# Patient Record
Sex: FEMALE | Race: WHITE | NOT HISPANIC OR LATINO | Employment: OTHER | URBAN - METROPOLITAN AREA
[De-identification: names, ages, dates, MRNs, and addresses within clinical notes are randomized per-mention and may not be internally consistent; named-entity substitution may affect disease eponyms.]

---

## 2017-01-11 ENCOUNTER — TRANSCRIBE ORDERS (OUTPATIENT)
Dept: ADMINISTRATIVE | Facility: HOSPITAL | Age: 72
End: 2017-01-11

## 2017-01-11 DIAGNOSIS — Z12.31 VISIT FOR SCREENING MAMMOGRAM: Primary | ICD-10-CM

## 2017-01-13 ENCOUNTER — HOSPITAL ENCOUNTER (OUTPATIENT)
Dept: MAMMOGRAPHY | Facility: HOSPITAL | Age: 72
Discharge: HOME | End: 2017-01-13
Admitting: FAMILY MEDICINE

## 2017-01-13 DIAGNOSIS — Z12.31 VISIT FOR SCREENING MAMMOGRAM: ICD-10-CM

## 2017-01-13 PROCEDURE — 77063 BREAST TOMOSYNTHESIS BI: CPT | Performed by: RADIOLOGY

## 2017-01-13 PROCEDURE — G0202 SCR MAMMO BI INCL CAD: HCPCS | Performed by: RADIOLOGY

## 2017-01-13 PROCEDURE — 77063 BREAST TOMOSYNTHESIS BI: CPT

## 2017-01-13 PROCEDURE — G0202 SCR MAMMO BI INCL CAD: HCPCS

## 2017-12-04 ENCOUNTER — TRANSCRIBE ORDERS (OUTPATIENT)
Dept: ADMINISTRATIVE | Facility: HOSPITAL | Age: 72
End: 2017-12-04

## 2017-12-04 DIAGNOSIS — Z12.31 VISIT FOR SCREENING MAMMOGRAM: Primary | ICD-10-CM

## 2018-02-05 ENCOUNTER — APPOINTMENT (OUTPATIENT)
Dept: MAMMOGRAPHY | Facility: HOSPITAL | Age: 73
End: 2018-02-05

## 2018-02-16 ENCOUNTER — HOSPITAL ENCOUNTER (OUTPATIENT)
Dept: MAMMOGRAPHY | Facility: HOSPITAL | Age: 73
Discharge: HOME | End: 2018-02-16
Admitting: FAMILY MEDICINE

## 2018-02-16 DIAGNOSIS — Z12.31 VISIT FOR SCREENING MAMMOGRAM: ICD-10-CM

## 2018-02-16 PROCEDURE — 77067 SCR MAMMO BI INCL CAD: CPT | Performed by: RADIOLOGY

## 2018-02-16 PROCEDURE — 77063 BREAST TOMOSYNTHESIS BI: CPT

## 2018-02-16 PROCEDURE — 77063 BREAST TOMOSYNTHESIS BI: CPT | Performed by: RADIOLOGY

## 2018-02-16 PROCEDURE — 77067 SCR MAMMO BI INCL CAD: CPT

## 2019-01-09 ENCOUNTER — TRANSCRIBE ORDERS (OUTPATIENT)
Dept: ADMINISTRATIVE | Facility: HOSPITAL | Age: 74
End: 2019-01-09

## 2019-01-09 DIAGNOSIS — Z12.31 VISIT FOR SCREENING MAMMOGRAM: Primary | ICD-10-CM

## 2019-02-22 ENCOUNTER — HOSPITAL ENCOUNTER (OUTPATIENT)
Dept: MAMMOGRAPHY | Facility: HOSPITAL | Age: 74
Discharge: HOME | End: 2019-02-22
Admitting: INTERNAL MEDICINE

## 2019-02-22 DIAGNOSIS — Z12.31 VISIT FOR SCREENING MAMMOGRAM: ICD-10-CM

## 2019-02-22 PROCEDURE — 77067 SCR MAMMO BI INCL CAD: CPT

## 2019-02-22 PROCEDURE — 77063 BREAST TOMOSYNTHESIS BI: CPT | Performed by: RADIOLOGY

## 2019-02-22 PROCEDURE — 77067 SCR MAMMO BI INCL CAD: CPT | Performed by: RADIOLOGY

## 2019-02-22 PROCEDURE — 77063 BREAST TOMOSYNTHESIS BI: CPT

## 2019-11-06 ENCOUNTER — TRANSCRIBE ORDERS (OUTPATIENT)
Dept: ADMINISTRATIVE | Facility: HOSPITAL | Age: 74
End: 2019-11-06

## 2019-11-06 DIAGNOSIS — K59.09 OTHER CONSTIPATION: Primary | ICD-10-CM

## 2019-11-07 ENCOUNTER — HOSPITAL ENCOUNTER (OUTPATIENT)
Dept: GENERAL RADIOLOGY | Facility: HOSPITAL | Age: 74
Discharge: HOME | End: 2019-11-07
Admitting: COLON & RECTAL SURGERY

## 2019-11-07 DIAGNOSIS — K59.09 OTHER CONSTIPATION: ICD-10-CM

## 2019-11-07 PROCEDURE — 74270 X-RAY XM COLON 1CNTRST STD: CPT

## 2019-11-07 PROCEDURE — 0 DIATRIZOATE MEGLUMINE & SODIUM PER 1 ML: Performed by: COLON & RECTAL SURGERY

## 2019-11-07 RX ADMIN — DIATRIZOATE MEGLUMINE AND DIATRIZOATE SODIUM 480 ML: 660; 100 LIQUID ORAL; RECTAL at 09:30

## 2020-01-13 ENCOUNTER — TRANSCRIBE ORDERS (OUTPATIENT)
Dept: ADMINISTRATIVE | Facility: HOSPITAL | Age: 75
End: 2020-01-13

## 2020-01-13 DIAGNOSIS — Z12.31 VISIT FOR SCREENING MAMMOGRAM: Primary | ICD-10-CM

## 2020-01-15 ENCOUNTER — HOSPITAL ENCOUNTER (OUTPATIENT)
Dept: GENERAL RADIOLOGY | Facility: HOSPITAL | Age: 75
Discharge: HOME | End: 2020-01-15
Admitting: COLON & RECTAL SURGERY

## 2020-01-15 ENCOUNTER — APPOINTMENT (OUTPATIENT)
Dept: PREADMISSION TESTING | Facility: HOSPITAL | Age: 75
End: 2020-01-15

## 2020-01-15 VITALS — HEIGHT: 69 IN | BODY MASS INDEX: 26.36 KG/M2 | WEIGHT: 178 LBS

## 2020-01-15 LAB
ANION GAP SERPL CALCULATED.3IONS-SCNC: 12 MMOL/L (ref 5–15)
BUN BLD-MCNC: 18 MG/DL (ref 8–23)
BUN/CREAT SERPL: 22 (ref 7–25)
CALCIUM SPEC-SCNC: 10.1 MG/DL (ref 8.6–10.5)
CHLORIDE SERPL-SCNC: 102 MMOL/L (ref 98–107)
CO2 SERPL-SCNC: 27 MMOL/L (ref 22–29)
CREAT BLD-MCNC: 0.82 MG/DL (ref 0.57–1)
DEPRECATED RDW RBC AUTO: 44.1 FL (ref 37–54)
ERYTHROCYTE [DISTWIDTH] IN BLOOD BY AUTOMATED COUNT: 12.7 % (ref 12.3–15.4)
GFR SERPL CREATININE-BSD FRML MDRD: 68 ML/MIN/1.73
GLUCOSE BLD-MCNC: 108 MG/DL (ref 65–99)
HBA1C MFR BLD: 5.5 % (ref 4.8–5.6)
HCT VFR BLD AUTO: 40.7 % (ref 34–46.6)
HGB BLD-MCNC: 13.2 G/DL (ref 12–15.9)
MCH RBC QN AUTO: 30.6 PG (ref 26.6–33)
MCHC RBC AUTO-ENTMCNC: 32.4 G/DL (ref 31.5–35.7)
MCV RBC AUTO: 94.4 FL (ref 79–97)
PLATELET # BLD AUTO: 278 10*3/MM3 (ref 140–450)
PMV BLD AUTO: 9.7 FL (ref 6–12)
POTASSIUM BLD-SCNC: 4.4 MMOL/L (ref 3.5–5.2)
RBC # BLD AUTO: 4.31 10*6/MM3 (ref 3.77–5.28)
SODIUM BLD-SCNC: 141 MMOL/L (ref 136–145)
WBC NRBC COR # BLD: 8.66 10*3/MM3 (ref 3.4–10.8)

## 2020-01-15 PROCEDURE — 36415 COLL VENOUS BLD VENIPUNCTURE: CPT

## 2020-01-15 PROCEDURE — 80048 BASIC METABOLIC PNL TOTAL CA: CPT | Performed by: COLON & RECTAL SURGERY

## 2020-01-15 PROCEDURE — 71046 X-RAY EXAM CHEST 2 VIEWS: CPT

## 2020-01-15 PROCEDURE — 85027 COMPLETE CBC AUTOMATED: CPT | Performed by: COLON & RECTAL SURGERY

## 2020-01-15 PROCEDURE — 83036 HEMOGLOBIN GLYCOSYLATED A1C: CPT | Performed by: COLON & RECTAL SURGERY

## 2020-01-15 PROCEDURE — 93005 ELECTROCARDIOGRAM TRACING: CPT

## 2020-01-15 RX ORDER — MELATONIN
1000 DAILY
COMMUNITY

## 2020-01-15 RX ORDER — OMEPRAZOLE 40 MG/1
40 CAPSULE, DELAYED RELEASE ORAL DAILY
COMMUNITY

## 2020-01-15 RX ORDER — DICLOFENAC SODIUM 75 MG/1
75 TABLET, DELAYED RELEASE ORAL DAILY
COMMUNITY
End: 2020-01-22 | Stop reason: HOSPADM

## 2020-01-15 RX ORDER — VITAMIN B COMPLEX
1 CAPSULE ORAL DAILY
COMMUNITY

## 2020-01-15 RX ORDER — MONTELUKAST SODIUM 10 MG/1
10 TABLET ORAL NIGHTLY
COMMUNITY

## 2020-01-15 RX ORDER — DIPHENHYDRAMINE HCL 25 MG
25 CAPSULE ORAL NIGHTLY PRN
COMMUNITY

## 2020-01-15 RX ORDER — SCOLOPAMINE TRANSDERMAL SYSTEM 1 MG/1
1 PATCH, EXTENDED RELEASE TRANSDERMAL CONTINUOUS
Status: DISCONTINUED | OUTPATIENT
Start: 2020-01-15 | End: 2020-01-15

## 2020-01-15 RX ORDER — ATORVASTATIN CALCIUM 40 MG/1
40 TABLET, FILM COATED ORAL NIGHTLY
COMMUNITY

## 2020-01-15 RX ORDER — ESTRADIOL 0.07 MG/D
1 FILM, EXTENDED RELEASE TRANSDERMAL 2 TIMES WEEKLY
COMMUNITY

## 2020-01-15 RX ORDER — AZELASTINE 1 MG/ML
1 SPRAY, METERED NASAL 2 TIMES DAILY
COMMUNITY

## 2020-01-15 NOTE — PAT
Patient to apply Chlorhexadine wipes  to surgical area (as instructed) the night before procedure and the AM of procedure. Wipes provided.    Patient instructed to drink 20 ounces (or until full) of Gatorade and it needs to be completed 1 hour before given arrival time for procedure (NO RED Gatorade)    Patient verbalized understanding.    Ally Webb notified of MRSA history.      Misa Ramirez GI Navigator, notified of pt surgery.

## 2020-01-19 ENCOUNTER — ANESTHESIA EVENT (OUTPATIENT)
Dept: PERIOP | Facility: HOSPITAL | Age: 75
End: 2020-01-19

## 2020-01-19 RX ORDER — FAMOTIDINE 20 MG/1
20 TABLET, FILM COATED ORAL ONCE
Status: CANCELLED | OUTPATIENT
Start: 2020-01-19 | End: 2020-01-19

## 2020-01-20 ENCOUNTER — ANESTHESIA (OUTPATIENT)
Dept: PERIOP | Facility: HOSPITAL | Age: 75
End: 2020-01-20

## 2020-01-20 ENCOUNTER — HOSPITAL ENCOUNTER (INPATIENT)
Facility: HOSPITAL | Age: 75
LOS: 2 days | Discharge: HOME | End: 2020-01-22
Attending: COLON & RECTAL SURGERY | Admitting: COLON & RECTAL SURGERY

## 2020-01-20 DIAGNOSIS — K62.3 RECTAL MUCOSA PROLAPSE: ICD-10-CM

## 2020-01-20 DIAGNOSIS — K59.09 CHRONIC CONSTIPATION: Primary | ICD-10-CM

## 2020-01-20 PROBLEM — Z86.14 HISTORY OF MRSA INFECTION: Status: ACTIVE | Noted: 2020-01-20

## 2020-01-20 PROBLEM — K21.9 GERD (GASTROESOPHAGEAL REFLUX DISEASE): Status: ACTIVE | Noted: 2020-01-20

## 2020-01-20 PROBLEM — J47.9 BRONCHIECTASIS WITHOUT COMPLICATION (HCC): Status: ACTIVE | Noted: 2020-01-20

## 2020-01-20 PROBLEM — D83.9 CVID (COMMON VARIABLE IMMUNODEFICIENCY): Status: ACTIVE | Noted: 2020-01-20

## 2020-01-20 PROBLEM — E78.2 MIXED HYPERLIPIDEMIA: Status: ACTIVE | Noted: 2020-01-20

## 2020-01-20 PROCEDURE — 25010000002 HEPARIN (PORCINE) PER 1000 UNITS: Performed by: COLON & RECTAL SURGERY

## 2020-01-20 PROCEDURE — 99231 SBSQ HOSP IP/OBS SF/LOW 25: CPT | Performed by: PHYSICIAN ASSISTANT

## 2020-01-20 PROCEDURE — 0DQP0ZZ REPAIR RECTUM, OPEN APPROACH: ICD-10-PCS | Performed by: COLON & RECTAL SURGERY

## 2020-01-20 PROCEDURE — 25010000002 DEXAMETHASONE PER 1 MG: Performed by: NURSE ANESTHETIST, CERTIFIED REGISTERED

## 2020-01-20 PROCEDURE — 0DNW0ZZ RELEASE PERITONEUM, OPEN APPROACH: ICD-10-PCS | Performed by: COLON & RECTAL SURGERY

## 2020-01-20 PROCEDURE — 88307 TISSUE EXAM BY PATHOLOGIST: CPT | Performed by: COLON & RECTAL SURGERY

## 2020-01-20 PROCEDURE — 25010000002 ONDANSETRON PER 1 MG: Performed by: COLON & RECTAL SURGERY

## 2020-01-20 PROCEDURE — 25010000002 HYDROMORPHONE PER 4 MG: Performed by: ANESTHESIOLOGY

## 2020-01-20 PROCEDURE — 25010000002 FENTANYL CITRATE (PF) 100 MCG/2ML SOLUTION: Performed by: NURSE ANESTHETIST, CERTIFIED REGISTERED

## 2020-01-20 PROCEDURE — 25010000003 LIDOCAINE 1 % SOLUTION: Performed by: NURSE ANESTHETIST, CERTIFIED REGISTERED

## 2020-01-20 PROCEDURE — 25010000002 ONDANSETRON PER 1 MG: Performed by: NURSE ANESTHETIST, CERTIFIED REGISTERED

## 2020-01-20 PROCEDURE — 25010000002 DEXAMETHASONE SODIUM PHOSPHATE 10 MG/ML SOLUTION: Performed by: ANESTHESIOLOGY

## 2020-01-20 PROCEDURE — 25010000002 VANCOMYCIN 10 G RECONSTITUTED SOLUTION: Performed by: COLON & RECTAL SURGERY

## 2020-01-20 PROCEDURE — 0DTN0ZZ RESECTION OF SIGMOID COLON, OPEN APPROACH: ICD-10-PCS | Performed by: COLON & RECTAL SURGERY

## 2020-01-20 PROCEDURE — 25010000002 NEOSTIGMINE 10 MG/10ML SOLUTION: Performed by: NURSE ANESTHETIST, CERTIFIED REGISTERED

## 2020-01-20 PROCEDURE — 25010000002 PROPOFOL 10 MG/ML EMULSION: Performed by: NURSE ANESTHETIST, CERTIFIED REGISTERED

## 2020-01-20 PROCEDURE — 25010000002 PHENYLEPHRINE PER 1 ML: Performed by: NURSE ANESTHETIST, CERTIFIED REGISTERED

## 2020-01-20 PROCEDURE — 0WQF0ZZ REPAIR ABDOMINAL WALL, OPEN APPROACH: ICD-10-PCS | Performed by: COLON & RECTAL SURGERY

## 2020-01-20 DEVICE — ENDOSCOPIC CURVED INTRALUMINAL STAPLER (ILS) 24 TITANIUM ADJUSTABLE HEIGHT STAPLES: Type: IMPLANTABLE DEVICE | Site: ABDOMEN | Status: FUNCTIONAL

## 2020-01-20 DEVICE — PROXIMATE RELOADABLE LINEAR CUTTER WITH SAFETY LOCK-OUT.  55MM LINEAR CUTTER.
Type: IMPLANTABLE DEVICE | Site: ABDOMEN | Status: FUNCTIONAL
Brand: PROXIMATE

## 2020-01-20 RX ORDER — PROPOFOL 10 MG/ML
VIAL (ML) INTRAVENOUS AS NEEDED
Status: DISCONTINUED | OUTPATIENT
Start: 2020-01-20 | End: 2020-01-20 | Stop reason: SURG

## 2020-01-20 RX ORDER — ALVIMOPAN 12 MG/1
12 CAPSULE ORAL 2 TIMES DAILY
Status: DISCONTINUED | OUTPATIENT
Start: 2020-01-21 | End: 2020-01-22

## 2020-01-20 RX ORDER — ATORVASTATIN CALCIUM 40 MG/1
40 TABLET, FILM COATED ORAL NIGHTLY
Status: DISCONTINUED | OUTPATIENT
Start: 2020-01-20 | End: 2020-01-22 | Stop reason: HOSPADM

## 2020-01-20 RX ORDER — ALVIMOPAN 12 MG/1
12 CAPSULE ORAL ONCE
Status: DISCONTINUED | OUTPATIENT
Start: 2020-01-20 | End: 2020-01-20

## 2020-01-20 RX ORDER — NEOSTIGMINE METHYLSULFATE 1 MG/ML
INJECTION, SOLUTION INTRAVENOUS AS NEEDED
Status: DISCONTINUED | OUTPATIENT
Start: 2020-01-20 | End: 2020-01-20 | Stop reason: SURG

## 2020-01-20 RX ORDER — ONDANSETRON 2 MG/ML
4 INJECTION INTRAMUSCULAR; INTRAVENOUS EVERY 6 HOURS PRN
Status: DISCONTINUED | OUTPATIENT
Start: 2020-01-20 | End: 2020-01-22 | Stop reason: HOSPADM

## 2020-01-20 RX ORDER — LIDOCAINE HYDROCHLORIDE 10 MG/ML
0.5 INJECTION, SOLUTION EPIDURAL; INFILTRATION; INTRACAUDAL; PERINEURAL ONCE AS NEEDED
Status: COMPLETED | OUTPATIENT
Start: 2020-01-20 | End: 2020-01-20

## 2020-01-20 RX ORDER — DEXAMETHASONE SODIUM PHOSPHATE 4 MG/ML
INJECTION, SOLUTION INTRA-ARTICULAR; INTRALESIONAL; INTRAMUSCULAR; INTRAVENOUS; SOFT TISSUE AS NEEDED
Status: DISCONTINUED | OUTPATIENT
Start: 2020-01-20 | End: 2020-01-20 | Stop reason: SURG

## 2020-01-20 RX ORDER — OXYCODONE HYDROCHLORIDE 5 MG/1
5 TABLET ORAL EVERY 4 HOURS PRN
Status: DISCONTINUED | OUTPATIENT
Start: 2020-01-20 | End: 2020-01-22 | Stop reason: HOSPADM

## 2020-01-20 RX ORDER — FENTANYL CITRATE 50 UG/ML
INJECTION, SOLUTION INTRAMUSCULAR; INTRAVENOUS AS NEEDED
Status: DISCONTINUED | OUTPATIENT
Start: 2020-01-20 | End: 2020-01-20 | Stop reason: SURG

## 2020-01-20 RX ORDER — ACETAMINOPHEN 500 MG
1000 TABLET ORAL ONCE
Status: COMPLETED | OUTPATIENT
Start: 2020-01-20 | End: 2020-01-20

## 2020-01-20 RX ORDER — ALVIMOPAN 12 MG/1
12 CAPSULE ORAL ONCE
Status: COMPLETED | OUTPATIENT
Start: 2020-01-20 | End: 2020-01-20

## 2020-01-20 RX ORDER — SODIUM CHLORIDE 0.9 % (FLUSH) 0.9 %
10 SYRINGE (ML) INJECTION EVERY 12 HOURS SCHEDULED
Status: DISCONTINUED | OUTPATIENT
Start: 2020-01-20 | End: 2020-01-20 | Stop reason: HOSPADM

## 2020-01-20 RX ORDER — SODIUM CHLORIDE 0.9 % (FLUSH) 0.9 %
10 SYRINGE (ML) INJECTION AS NEEDED
Status: DISCONTINUED | OUTPATIENT
Start: 2020-01-20 | End: 2020-01-20 | Stop reason: HOSPADM

## 2020-01-20 RX ORDER — PREGABALIN 75 MG/1
75 CAPSULE ORAL ONCE
Status: COMPLETED | OUTPATIENT
Start: 2020-01-20 | End: 2020-01-20

## 2020-01-20 RX ORDER — MONTELUKAST SODIUM 10 MG/1
10 TABLET ORAL NIGHTLY
Status: DISCONTINUED | OUTPATIENT
Start: 2020-01-20 | End: 2020-01-22 | Stop reason: HOSPADM

## 2020-01-20 RX ORDER — ATRACURIUM BESYLATE 10 MG/ML
INJECTION, SOLUTION INTRAVENOUS AS NEEDED
Status: DISCONTINUED | OUTPATIENT
Start: 2020-01-20 | End: 2020-01-20 | Stop reason: SURG

## 2020-01-20 RX ORDER — FAMOTIDINE 10 MG/ML
20 INJECTION, SOLUTION INTRAVENOUS ONCE
Status: COMPLETED | OUTPATIENT
Start: 2020-01-20 | End: 2020-01-20

## 2020-01-20 RX ORDER — MELOXICAM 15 MG/1
15 TABLET ORAL ONCE
Status: COMPLETED | OUTPATIENT
Start: 2020-01-20 | End: 2020-01-20

## 2020-01-20 RX ORDER — DIPHENHYDRAMINE HCL 25 MG
25 CAPSULE ORAL NIGHTLY PRN
Status: DISCONTINUED | OUTPATIENT
Start: 2020-01-20 | End: 2020-01-22 | Stop reason: HOSPADM

## 2020-01-20 RX ORDER — DEXAMETHASONE SODIUM PHOSPHATE 10 MG/ML
INJECTION, SOLUTION INTRAMUSCULAR; INTRAVENOUS
Status: COMPLETED | OUTPATIENT
Start: 2020-01-20 | End: 2020-01-20

## 2020-01-20 RX ORDER — SODIUM CHLORIDE, SODIUM LACTATE, POTASSIUM CHLORIDE, CALCIUM CHLORIDE 600; 310; 30; 20 MG/100ML; MG/100ML; MG/100ML; MG/100ML
9 INJECTION, SOLUTION INTRAVENOUS CONTINUOUS
Status: DISCONTINUED | OUTPATIENT
Start: 2020-01-20 | End: 2020-01-22 | Stop reason: HOSPADM

## 2020-01-20 RX ORDER — BUPIVACAINE HYDROCHLORIDE 2.5 MG/ML
INJECTION, SOLUTION EPIDURAL; INFILTRATION; INTRACAUDAL
Status: COMPLETED | OUTPATIENT
Start: 2020-01-20 | End: 2020-01-20

## 2020-01-20 RX ORDER — AZELASTINE 1 MG/ML
1 SPRAY, METERED NASAL 2 TIMES DAILY
Status: DISCONTINUED | OUTPATIENT
Start: 2020-01-20 | End: 2020-01-22 | Stop reason: HOSPADM

## 2020-01-20 RX ORDER — LIDOCAINE HYDROCHLORIDE 10 MG/ML
INJECTION, SOLUTION INFILTRATION; PERINEURAL AS NEEDED
Status: DISCONTINUED | OUTPATIENT
Start: 2020-01-20 | End: 2020-01-20 | Stop reason: SURG

## 2020-01-20 RX ORDER — MAGNESIUM HYDROXIDE 1200 MG/15ML
LIQUID ORAL AS NEEDED
Status: DISCONTINUED | OUTPATIENT
Start: 2020-01-20 | End: 2020-01-20 | Stop reason: HOSPADM

## 2020-01-20 RX ORDER — PANTOPRAZOLE SODIUM 40 MG/1
40 TABLET, DELAYED RELEASE ORAL EVERY MORNING
Status: DISCONTINUED | OUTPATIENT
Start: 2020-01-21 | End: 2020-01-22 | Stop reason: HOSPADM

## 2020-01-20 RX ORDER — MORPHINE SULFATE 2 MG/ML
2 INJECTION, SOLUTION INTRAMUSCULAR; INTRAVENOUS
Status: DISCONTINUED | OUTPATIENT
Start: 2020-01-20 | End: 2020-01-21

## 2020-01-20 RX ORDER — HEPARIN SODIUM 5000 [USP'U]/ML
5000 INJECTION, SOLUTION INTRAVENOUS; SUBCUTANEOUS ONCE
Status: COMPLETED | OUTPATIENT
Start: 2020-01-20 | End: 2020-01-20

## 2020-01-20 RX ORDER — HYDROMORPHONE HYDROCHLORIDE 1 MG/ML
0.5 INJECTION, SOLUTION INTRAMUSCULAR; INTRAVENOUS; SUBCUTANEOUS
Status: DISCONTINUED | OUTPATIENT
Start: 2020-01-20 | End: 2020-01-20 | Stop reason: HOSPADM

## 2020-01-20 RX ORDER — ACETAMINOPHEN 500 MG
1000 TABLET ORAL EVERY 8 HOURS SCHEDULED
Status: DISPENSED | OUTPATIENT
Start: 2020-01-20 | End: 2020-01-22

## 2020-01-20 RX ORDER — ONDANSETRON 2 MG/ML
INJECTION INTRAMUSCULAR; INTRAVENOUS AS NEEDED
Status: DISCONTINUED | OUTPATIENT
Start: 2020-01-20 | End: 2020-01-20 | Stop reason: SURG

## 2020-01-20 RX ORDER — DIAZEPAM 5 MG/1
5 TABLET ORAL EVERY 8 HOURS PRN
Status: DISCONTINUED | OUTPATIENT
Start: 2020-01-20 | End: 2020-01-22 | Stop reason: HOSPADM

## 2020-01-20 RX ORDER — SODIUM CHLORIDE 9 MG/ML
100 INJECTION, SOLUTION INTRAVENOUS ONCE
Status: COMPLETED | OUTPATIENT
Start: 2020-01-20 | End: 2020-01-20

## 2020-01-20 RX ORDER — NALOXONE HCL 0.4 MG/ML
0.4 VIAL (ML) INJECTION
Status: DISCONTINUED | OUTPATIENT
Start: 2020-01-20 | End: 2020-01-21

## 2020-01-20 RX ORDER — FENTANYL CITRATE 50 UG/ML
50 INJECTION, SOLUTION INTRAMUSCULAR; INTRAVENOUS
Status: DISCONTINUED | OUTPATIENT
Start: 2020-01-20 | End: 2020-01-20 | Stop reason: HOSPADM

## 2020-01-20 RX ORDER — GLYCOPYRROLATE 0.2 MG/ML
INJECTION INTRAMUSCULAR; INTRAVENOUS AS NEEDED
Status: DISCONTINUED | OUTPATIENT
Start: 2020-01-20 | End: 2020-01-20 | Stop reason: SURG

## 2020-01-20 RX ADMIN — HYDROMORPHONE HYDROCHLORIDE 0.5 MG: 1 INJECTION, SOLUTION INTRAMUSCULAR; INTRAVENOUS; SUBCUTANEOUS at 14:45

## 2020-01-20 RX ADMIN — ALVIMOPAN 12 MG: 12 CAPSULE ORAL at 09:24

## 2020-01-20 RX ADMIN — FAMOTIDINE 20 MG: 10 INJECTION INTRAVENOUS at 08:51

## 2020-01-20 RX ADMIN — DEXAMETHASONE SODIUM PHOSPHATE 4 MG: 10 INJECTION INTRAMUSCULAR; INTRAVENOUS at 10:41

## 2020-01-20 RX ADMIN — NEOSTIGMINE METHYLSULFATE 3 MG: 1 INJECTION, SOLUTION INTRAVENOUS at 12:00

## 2020-01-20 RX ADMIN — MONTELUKAST SODIUM 10 MG: 10 TABLET, COATED ORAL at 20:22

## 2020-01-20 RX ADMIN — LIDOCAINE HYDROCHLORIDE 0.2 ML: 10 INJECTION, SOLUTION EPIDURAL; INFILTRATION; INTRACAUDAL; PERINEURAL at 08:55

## 2020-01-20 RX ADMIN — SODIUM CHLORIDE, POTASSIUM CHLORIDE, SODIUM LACTATE AND CALCIUM CHLORIDE 9 ML/HR: 600; 310; 30; 20 INJECTION, SOLUTION INTRAVENOUS at 08:51

## 2020-01-20 RX ADMIN — GLYCOPYRROLATE 0.4 MG: 0.2 INJECTION, SOLUTION INTRAMUSCULAR; INTRAVENOUS at 12:00

## 2020-01-20 RX ADMIN — ACETAMINOPHEN 1000 MG: 500 TABLET, FILM COATED ORAL at 17:41

## 2020-01-20 RX ADMIN — SODIUM CHLORIDE 100 ML/HR: 9 INJECTION, SOLUTION INTRAVENOUS at 16:04

## 2020-01-20 RX ADMIN — DEXAMETHASONE SODIUM PHOSPHATE 6 MG: 4 INJECTION, SOLUTION INTRAMUSCULAR; INTRAVENOUS at 10:38

## 2020-01-20 RX ADMIN — AZELASTINE HYDROCHLORIDE 1 SPRAY: 137 SPRAY, METERED NASAL at 20:23

## 2020-01-20 RX ADMIN — MELOXICAM 15 MG: 15 TABLET ORAL at 08:46

## 2020-01-20 RX ADMIN — ATORVASTATIN CALCIUM 40 MG: 40 TABLET, FILM COATED ORAL at 20:22

## 2020-01-20 RX ADMIN — PREGABALIN 75 MG: 75 CAPSULE ORAL at 08:46

## 2020-01-20 RX ADMIN — ACETAMINOPHEN 1000 MG: 500 TABLET ORAL at 08:46

## 2020-01-20 RX ADMIN — FENTANYL CITRATE 100 MCG: 50 INJECTION, SOLUTION INTRAMUSCULAR; INTRAVENOUS at 12:14

## 2020-01-20 RX ADMIN — VANCOMYCIN HYDROCHLORIDE 1250 MG: 10 INJECTION, POWDER, LYOPHILIZED, FOR SOLUTION INTRAVENOUS at 09:19

## 2020-01-20 RX ADMIN — GLYCOPYRROLATE 0.2 MG: 0.2 INJECTION, SOLUTION INTRAMUSCULAR; INTRAVENOUS at 10:55

## 2020-01-20 RX ADMIN — PROPOFOL 200 MG: 10 INJECTION, EMULSION INTRAVENOUS at 10:31

## 2020-01-20 RX ADMIN — ATRACURIUM BESYLATE 10 MG: 10 INJECTION, SOLUTION INTRAVENOUS at 10:45

## 2020-01-20 RX ADMIN — PHENYLEPHRINE HYDROCHLORIDE 100 MCG: 10 INJECTION INTRAVENOUS at 11:25

## 2020-01-20 RX ADMIN — DIAZEPAM 5 MG: 5 TABLET ORAL at 20:22

## 2020-01-20 RX ADMIN — LIDOCAINE HYDROCHLORIDE 50 MG: 10 INJECTION, SOLUTION INFILTRATION; PERINEURAL at 10:31

## 2020-01-20 RX ADMIN — HEPARIN SODIUM 5000 UNITS: 5000 INJECTION, SOLUTION INTRAVENOUS; SUBCUTANEOUS at 08:47

## 2020-01-20 RX ADMIN — ONDANSETRON 4 MG: 2 INJECTION INTRAMUSCULAR; INTRAVENOUS at 12:59

## 2020-01-20 RX ADMIN — ONDANSETRON 4 MG: 2 INJECTION INTRAMUSCULAR; INTRAVENOUS at 11:57

## 2020-01-20 RX ADMIN — BUPIVACAINE HYDROCHLORIDE 60 ML: 2.5 INJECTION, SOLUTION EPIDURAL; INFILTRATION; INTRACAUDAL; PERINEURAL at 10:41

## 2020-01-20 RX ADMIN — ATRACURIUM BESYLATE 40 MG: 10 INJECTION, SOLUTION INTRAVENOUS at 10:31

## 2020-01-20 NOTE — H&P
Pre-Op H&P  Denise Briggs  [id number]  1945    Chief complaint: Decreased anal sphincter tone, constipation, rectal prolapse    HPI:    Patient is a 74 y.o.female who presents with decreased anal sphincter tone, chronic constipation and rectal mucosa prolapse causing blockage of the opening and inability to have productive bowel movement.  Here today to undergo extended left hemicolectomy with rectopexy. Of note, pt has common variable immunodeficiency s/p IgG infusion on Friday 1/17/20.    Review of Systems:  General ROS: negative for chills, fever or skin lesions;  No changes since last office visit  Cardiovascular ROS: no chest pain or dyspnea on exertion  Respiratory ROS: no cough, shortness of breath, or wheezing    Allergies:   Allergies   Allergen Reactions   • Mushroom Nausea And Vomiting   • Penicillins Rash       Home Meds:    No current facility-administered medications on file prior to encounter.      No current outpatient medications on file prior to encounter.       PMH:   Past Medical History:   Diagnosis Date   • Arthritis    • Bronchiectasis (CMS/HCC)    • Constipation    • CVID (common variable immunodeficiency) (CMS/HCC)     has infusions monthly and follows up with Dr. Paul   • Elevated cholesterol    • GERD (gastroesophageal reflux disease)    • MRSA infection     ~ 2016 after laminectomy.  Debreded incision and tx with oral abx, followed by infection disease at that time.     • Pneumonia     x 20 occurences per pt report, last issue around 2010   • Rectal prolapse      PSH:    Past Surgical History:   Procedure Laterality Date   • ABDOMINAL SURGERY      r/t endometriosis   • APPENDECTOMY     • BRONCHOSCOPY      x2   • COLONOSCOPY     • HYSTERECTOMY     • LUMBAR LAMINECTOMY     • LUMBAR WOUND DEBRIDEMENT      do to MRSA after laminectomy ~ 2016   • OOPHORECTOMY     • RECTAL PROLAPSE REPAIR     • REDUCTION MAMMAPLASTY Bilateral 1975     Social History:   Tobacco:   Social  "History     Tobacco Use   Smoking Status Never Smoker   Smokeless Tobacco Never Used      Alcohol:     Social History     Substance and Sexual Activity   Alcohol Use Yes    Comment: rare       Vitals:           /79 (BP Location: Right arm, Patient Position: Lying)   Pulse 66   Temp 98.7 °F (37.1 °C) (Temporal)   Resp 14   Ht 175.3 cm (69.02\")   Wt 80.7 kg (178 lb)   SpO2 96%   BMI 26.27 kg/m²     Physical Exam:  General Appearance:    Alert, cooperative, no distress, appears stated age   Head:    Normocephalic, without obvious abnormality, atraumatic   Lungs:     Clear to auscultation bilaterally, respirations unlabored    Heart:   Regular rate and rhythm, S1 and S2 normal, no murmur, rub    or gallop    Abdomen:    Soft, non-tender.  +bowel sounds   Breast Exam:    deferred   Genitalia:    deferred   Extremities:   Extremities normal, atraumatic, no cyanosis or edema   Skin:   Skin color, texture, turgor normal, no rashes or lesions   Neurologic:   Grossly intact   Results Review  LABS:  Lab Results   Component Value Date    WBC 8.66 01/15/2020    HGB 13.2 01/15/2020    HCT 40.7 01/15/2020    MCV 94.4 01/15/2020     01/15/2020    GLUCOSE 108 (H) 01/15/2020    BUN 18 01/15/2020    CREATININE 0.82 01/15/2020    EGFRIFNONA 68 01/15/2020     01/15/2020    K 4.4 01/15/2020     01/15/2020    CO2 27.0 01/15/2020    CALCIUM 10.1 01/15/2020       RADIOLOGY:  Imaging Results (Last 72 Hours)     ** No results found for the last 72 hours. **          I reviewed the patient's new clinical results.    Cancer Staging (if applicable)  Cancer Patient: __ yes _x_no __unknown; If yes, clinical stage T:__ N:__M:__, stage group or __N/A    Impression: Decreased anal sphincter tone, chronic constipation and rectal mucosa prolapse    Plan: Extended left hemicolectomy with rectopexy    Nereyda Pavon, APRN   1/20/2020   9:45 AM  "

## 2020-01-20 NOTE — ANESTHESIA PREPROCEDURE EVALUATION
Anesthesia Evaluation     Patient summary reviewed and Nursing notes reviewed   history of anesthetic complications: prolonged sedation  NPO Solid Status: > 8 hours  NPO Liquid Status: > 8 hours           Airway   Mallampati: II  TM distance: >3 FB  Neck ROM: full  No difficulty expected  Dental      Pulmonary - normal exam   (+) pneumonia ,   Cardiovascular - normal exam    (+) hyperlipidemia,       Neuro/Psych  GI/Hepatic/Renal/Endo    (+)  GERD,      Musculoskeletal     Abdominal    Substance History      OB/GYN          Other   arthritis,                      Anesthesia Plan    ASA 2     general   (Taps)  intravenous induction     Anesthetic plan, all risks, benefits, and alternatives have been provided, discussed and informed consent has been obtained with: patient.    Plan discussed with CRNA.

## 2020-01-20 NOTE — ANESTHESIA POSTPROCEDURE EVALUATION
Patient: Denise Briggs    Procedure Summary     Date:  01/20/20 Room / Location:   [facility] /  [facility]    Anesthesia Start:  1026 Anesthesia Stop:      Procedure:  EXTEND LEFT HEMICOLECTOMY WITH RECTOPEXY (Left Abdomen) Diagnosis:      Surgeon:  Luke Vail MD Provider:  Edgar Rocha MD    Anesthesia Type:  general ASA Status:  2          Anesthesia Type: general    Vitals  Vitals Value Taken Time   /89 1/20/2020 12:14 PM   Temp 97.8 °F (36.6 °C) 1/20/2020 12:14 PM   Pulse 67 1/20/2020 12:14 PM   Resp 29 1/20/2020 12:14 PM   SpO2 100 % 1/20/2020 12:14 PM           Post Anesthesia Care and Evaluation    Patient location during evaluation: PACU  Patient participation: complete - patient participated  Level of consciousness: awake and responsive to verbal stimuli  Pain score: 4  Pain management: adequate  Airway patency: patent  Anesthetic complications: No anesthetic complications  PONV Status: none  Cardiovascular status: stable  Respiratory status: acceptable, nasal cannula and spontaneous ventilation  Hydration status: stable    Comments: Pt transferred to PACU with O2. Vital signs stable. Report to PACU RN and care accepted.

## 2020-01-20 NOTE — CONSULTS
[facility] Medicine Services  CONSULT NOTE      Patient Name: Denise Briggs  : 1945  MRN: [id number]    Primary Care Physician: Fox Braswell MD  Provider requesting consultation: Luke Vail MD    Subjective     Reason for Consultation: post-operative medical management     HPI:  Denise Briggs is a 74 y.o. female with PMH significant for bronchiectasis, HLD, GERD, chronic constipation, CVID (monthly IVIG infusions, last on ) and recurrent pneumonia. Due to chronic constipation, rectal prolapse and decreased rectal sphincter tone, she was admitted to [facility] on 20 for extended L hemicolectomy with rectopexy by Dr. Vail. Hospital medicine was consulted postoperatively to assist with medical management.      At time of exam , Ms. Briggs was resting in bed with  at bedside. Pain was uncontrolled after surgery but was currently improved after a pain pill. She was concerned about 2-3 lumps on her neck - these felt like lymph nodes and she did endorse a runny nose for the past 1-2 months. She reported a history of MRSA after two surgeries and requested prophylactic antibiotics for this. She states that her immunologist, Dr. Paul, swabbed her for MRSA and she is not a carrier.     Review of Systems   Constitutional: Negative.    HENT: Positive for rhinorrhea.    Respiratory: Negative.    Cardiovascular: Negative.    Gastrointestinal: Positive for abdominal pain. Negative for nausea and vomiting.   Genitourinary:        Rodriguez catheter in place   Musculoskeletal: Negative.    Skin: Negative.    Neurological: Negative.    Psychiatric/Behavioral: Negative.      All other systems reviewed and are negative.     Personal History     Past Medical History:   Diagnosis Date   • Arthritis    • Bronchiectasis (CMS/HCC)    • Constipation    • CVID (common variable immunodeficiency) (CMS/HCC)     has infusions monthly  and follows up with Dr. Paul   • Elevated cholesterol    • GERD (gastroesophageal reflux disease)    • MRSA infection     ~ 2016 after laminectomy.  Debreded incision and tx with oral abx, followed by infection disease at that time.     • Pneumonia     x 20 occurences per pt report, last issue around 2010   • Rectal prolapse      Past Surgical History:   Procedure Laterality Date   • ABDOMINAL SURGERY      r/t endometriosis   • APPENDECTOMY     • BRONCHOSCOPY      x2   • COLONOSCOPY     • HYSTERECTOMY     • LUMBAR LAMINECTOMY     • LUMBAR WOUND DEBRIDEMENT      do to MRSA after laminectomy ~ 2016   • OOPHORECTOMY     • RECTAL PROLAPSE REPAIR     • REDUCTION MAMMAPLASTY Bilateral 1975     Family History: family history includes Breast cancer (age of onset: 50) in her sister. Otherwise pertinent FHx was reviewed and unremarkable.     Social History:  reports that she has never smoked. She has never used smokeless tobacco. She reports that she drinks alcohol. She reports that she does not use drugs.    Medications:  Medications Prior to Admission   Medication Sig Dispense Refill Last Dose   • azelastine (ASTELIN) 0.1 % nasal spray 1 spray into the nostril(s) as directed by provider 2 (Two) Times a Day. Use in each nostril as directed   1/20/2020 at 0600   • B Complex Vitamins (VITAMIN B COMPLEX) capsule capsule Take 1 capsule by mouth Daily.   1/19/2020 at 0800   • cholecalciferol (VITAMIN D3) 25 MCG (1000 UT) tablet Take 1,000 Units by mouth Daily.   1/19/2020 at 0800   • diclofenac (VOLTAREN) 75 MG EC tablet Take 75 mg by mouth Daily.   1/19/2020 at 0800   • diphenhydrAMINE (BENADRYL) 25 mg capsule Take 25 mg by mouth At Night As Needed for Itching.   1/18/2020 at 0800   • estradiol (MINIVELLE, VIVELLE-DOT) 0.075 MG/24HR patch Place 1 patch on the skin as directed by provider 2 (Two) Times a Week. Change on Tuesday and Friday 1/19/2020 at 2100   • montelukast (SINGULAIR) 10 MG tablet Take 10 mg by mouth Every  Night.   1/19/2020 at 2100   • Multiple Vitamins-Minerals (MULTIVITAMIN ADULT PO) Take 1 tablet/day by mouth Daily.   1/19/2020 at 0800   • omeprazole (priLOSEC) 40 MG capsule Take 40 mg by mouth Daily.   1/19/2020 at 0800   • atorvastatin (LIPITOR) 40 MG tablet Take 40 mg by mouth Every Night.        Scheduled Meds:  acetaminophen 1,000 mg Oral Q8H   [START ON 1/21/2020] alvimopan 12 mg Oral BID   [START ON 1/21/2020] enoxaparin 40 mg Subcutaneous Daily     Continuous Infusions:  lactated ringers 9 mL/hr Last Rate: 9 mL/hr (01/20/20 0851)     PRN Meds:  diazePAM  •  Morphine **AND** naloxone  •  ondansetron  •  oxyCODONE    Allergies   Allergen Reactions   • Mushroom Nausea And Vomiting   • Penicillins Rash     Objective     Vital Signs:   Temp:  [97.5 °F (36.4 °C)-98.7 °F (37.1 °C)] 97.5 °F (36.4 °C)  Heart Rate:  [54-77] 77  Resp:  [14-29] 16  BP: (109-147)/(64-91) 131/72     Physical Exam  Constitutional: Awake, alert and conversant. Laying in bed with  at bedside.   Eyes: PERRLA, sclerae anicteric, no conjunctival injection  HENT: NCAT, mucous membranes moist  Neck: Supple, no thyromegaly, no lymphadenopathy, trachea midline  Respiratory: Clear to auscultation bilaterally, nonlabored respirations   Cardiovascular: RRR, no murmurs, rubs, or gallops, palpable pedal pulses bilaterally  Gastrointestinal: Hypoactive bowel sounds, soft, appropriately tender. Midline abdominal incision dressed, did not remove dressing for exam   : Rodriguez catheter in place   Musculoskeletal: No bilateral ankle edema, no clubbing or cyanosis to extremities  Psychiatric: Appropriate affect, cooperative  Neurologic: Oriented x 3, strength symmetric in all extremities, Cranial Nerves grossly intact to confrontation, speech clear  Skin: No rashes    Results Reviewed:  I have personally reviewed current lab, radiology, and data and agree.    Results from last 7 days   Lab Units 01/15/20  1314   WBC 10*3/mm3 8.66   HEMOGLOBIN g/dL  13.2   HEMATOCRIT % 40.7   PLATELETS 10*3/mm3 278     Results from last 7 days   Lab Units 01/15/20  1314   SODIUM mmol/L 141   POTASSIUM mmol/L 4.4   CHLORIDE mmol/L 102   CO2 mmol/L 27.0   BUN mg/dL 18   CREATININE mg/dL 0.82   GLUCOSE mg/dL 108*   CALCIUM mg/dL 10.1     Estimated Creatinine Clearance: 67.4 mL/min (by C-G formula based on SCr of 0.82 mg/dL).  Brief Urine Lab Results     None        No results found for: BNP    Microbiology Results Abnormal     None        Imaging Results (Last 24 Hours)     ** No results found for the last 24 hours. **        Assessment & Plan     Active Hospital Problems    Diagnosis  POA   • **Rectal mucosa prolapse [K62.3]  Yes   • CVID (common variable immunodeficiency) (CMS/HCC) [D83.9]  Yes   • History of MRSA infection [Z86.14]  Yes   • Chronic constipation [K59.09]  Yes   • Bronchiectasis without complication (CMS/HCC) [J47.9]  Yes   • GERD (gastroesophageal reflux disease) [K21.9]  Yes   • Mixed hyperlipidemia [E78.2]  Yes      Resolved Hospital Problems   No resolved problems to display.     Denise Briggs is a 74 y.o. female with PMH significant for bronchiectasis, HLD, GERD, chronic constipation, CVID (monthly IVIG infusions, last on Friday 1/17) and recurrent pneumonia. Due to chronic constipation, rectal prolapse and decreased rectal sphincter tone, she was admitted to [facility] on 1/20/20 for extended L hemicolectomy with rectopexy by Dr. Vail. Hospital medicine was consulted postoperatively to assist with medical management.     Chronic constipation  Rectal prolapse  - s/p extended L hemicolectomy with rectopexy and umbilical hernia repair by Dr. Vail 1/20/20  - Rodriguez catheter in place, dc with OK with Yared  - Full liquid diet  - Encourage ambulation and OOB  - PRN pain control and antiemetics  - AM CBC/CMP     CVID  - Followed by Dr. Sky Paul, received her monthly IVIG infusion on 1/17/20    History of MRSA infection   - Per  patient, was screened for MRSA and is not a carrier  - Does have history of MRSA post-surgical infection x 2   - She is requesting prophylactic antibiotics  - Received IV Vancomycin preoperatively  - Will defer further antibiotics to Dr. Lara for the time being     Bronchiectasis, stable. Continue home medications  Hyperlipidemia, stable. Continue home statin  GERD, stable. Continue home meds     Thank you for allowing [facility] Medicine Service to provide consultative care for your patient, we will continue to follow while clinically appropriate.    Electronically signed by Cristina Munguia PA-C, 01/20/20, 4:24 PM.

## 2020-01-20 NOTE — ANESTHESIA PROCEDURE NOTES
Bilateral TAP blocks      Patient reassessed immediately prior to procedure    Patient location during procedure: OR  Reason for block: at surgeon's request and post-op pain management  Performed by  CRNA: Gisel Lee CRNA  Assisted by: Angeles Arias RN  Preanesthetic Checklist  Completed: patient identified, site marked, surgical consent, pre-op evaluation, timeout performed, IV checked, risks and benefits discussed and monitors and equipment checked  Prep:  Pt Position: supine  Sterile barriers:cap, gloves, sterile barriers and mask  Prep: ChloraPrep  Patient monitoring: blood pressure monitoring, continuous pulse oximetry and EKG  Procedure  Sedation:yes  Performed under: general  Guidance:ultrasound guided  Images:still images obtained, printed/placed on chart    Laterality:Bilateral  Block Type:TAP  Injection Technique:single-shot  Needle Type:short-bevel and echogenic  Needle Gauge:20 G  Resistance on Injection: none    Medications Used: bupivacaine PF (MARCAINE) 0.25 % injection, 60 mL  dexamethasone sodium phosphate injection, 4 mg  Med admintered at 1/20/2020 10:41 AM      Medications  Comment:Block Injection:  LA dose divided between Right and Left block        Post Assessment  Injection Assessment: negative aspiration for heme, incremental injection and no paresthesia on injection  Patient Tolerance:comfortable throughout block  Complications:no  Additional Notes      Under Ultrasound guidance, a BBraun 4inch 360 degree needle was advanced with Normal Saline hydro dissection of tissue.  The Internal Oblique and Transversus Abdominus muscles where visualized.  At or before the aponeurosis of Internal Oblique, local anesthetic spread was visualized in the Transversus Abdominus Plane. Injection was made incrementally with aspiration every 5 mls.  There was no  intravascular injection,  injection pressure was normal, there was no neural injection, and the procedure was completed without  difficulty.  Thank You.

## 2020-01-20 NOTE — ANESTHESIA PROCEDURE NOTES
Airway  Urgency: elective    Date/Time: 1/20/2020 10:34 AM  Airway not difficult    General Information and Staff    Patient location during procedure: OR  CRNA: Gisel Lee CRNA    Indications and Patient Condition  Indications for airway management: airway protection    Preoxygenated: yes  MILS not maintained throughout  Mask difficulty assessment: 2 - vent by mask + OA or adjuvant +/- NMBA    Final Airway Details  Final airway type: endotracheal airway      Successful airway: ETT  Cuffed: yes   Successful intubation technique: direct laryngoscopy  Facilitating devices/methods: intubating stylet  Endotracheal tube insertion site: oral  Blade: Freitas  Blade size: 2  ETT size (mm): 7.0  Cormack-Lehane Classification: grade I - full view of glottis  Placement verified by: chest auscultation and capnometry   Cuff volume (mL): 5  Measured from: lips  ETT/EBT  to lips (cm): 21  Number of attempts at approach: 1  Assessment: lips, teeth, and gum same as pre-op and atraumatic intubation    Additional Comments  Pt to OR 2. Pt moved self to OR table. ASA monitors placed. Pre-O2 with 100% Oxygen. SIVI. Atraumatic intubation. +ETCO2, +BBS.

## 2020-01-20 NOTE — PLAN OF CARE
VSS, could not ERAS from stretcher to bed due to sedation. Minimal c/o pain, reported to be tolerable at this time. Will continue to monitor.   Problem: Patient Care Overview  Goal: Plan of Care Review  Outcome: Ongoing (interventions implemented as appropriate)     Problem: Patient Care Overview  Goal: Individualization and Mutuality  Outcome: Ongoing (interventions implemented as appropriate)     Problem: Patient Care Overview  Goal: Discharge Needs Assessment  Outcome: Ongoing (interventions implemented as appropriate)     Problem: Patient Care Overview  Goal: Interprofessional Rounds/Family Conf  Outcome: Ongoing (interventions implemented as appropriate)     Problem: Pain, Chronic (Adult)  Goal: Identify Related Risk Factors and Signs and Symptoms  Outcome: Ongoing (interventions implemented as appropriate)     Problem: Pain, Chronic (Adult)  Goal: Acceptable Pain/Comfort Level and Functional Ability  Outcome: Ongoing (interventions implemented as appropriate)     Problem: Bowel Resection (Adult)  Goal: Signs and Symptoms of Listed Potential Problems Will be Absent, Minimized or Managed (Bowel Resection)  Outcome: Ongoing (interventions implemented as appropriate)     Problem: Bowel Resection (Adult)  Goal: Anesthesia/Sedation Recovery  Outcome: Ongoing (interventions implemented as appropriate)

## 2020-01-21 LAB
ALBUMIN SERPL-MCNC: 3.4 G/DL (ref 3.5–5.2)
ALBUMIN/GLOB SERPL: 1 G/DL
ALP SERPL-CCNC: 49 U/L (ref 39–117)
ALT SERPL W P-5'-P-CCNC: 18 U/L (ref 1–33)
ANION GAP SERPL CALCULATED.3IONS-SCNC: 12 MMOL/L (ref 5–15)
AST SERPL-CCNC: 18 U/L (ref 1–32)
BILIRUB SERPL-MCNC: 0.4 MG/DL (ref 0.2–1.2)
BUN BLD-MCNC: 8 MG/DL (ref 8–23)
BUN/CREAT SERPL: 14 (ref 7–25)
CALCIUM SPEC-SCNC: 8.3 MG/DL (ref 8.6–10.5)
CHLORIDE SERPL-SCNC: 101 MMOL/L (ref 98–107)
CO2 SERPL-SCNC: 21 MMOL/L (ref 22–29)
CREAT BLD-MCNC: 0.57 MG/DL (ref 0.57–1)
DEPRECATED RDW RBC AUTO: 42.3 FL (ref 37–54)
ERYTHROCYTE [DISTWIDTH] IN BLOOD BY AUTOMATED COUNT: 12.7 % (ref 12.3–15.4)
GFR SERPL CREATININE-BSD FRML MDRD: 103 ML/MIN/1.73
GLOBULIN UR ELPH-MCNC: 3.3 GM/DL
GLUCOSE BLD-MCNC: 122 MG/DL (ref 65–99)
HCT VFR BLD AUTO: 36.1 % (ref 34–46.6)
HGB BLD-MCNC: 11.6 G/DL (ref 12–15.9)
MCH RBC QN AUTO: 29.3 PG (ref 26.6–33)
MCHC RBC AUTO-ENTMCNC: 32.1 G/DL (ref 31.5–35.7)
MCV RBC AUTO: 91.2 FL (ref 79–97)
PLATELET # BLD AUTO: 241 10*3/MM3 (ref 140–450)
PMV BLD AUTO: 10.1 FL (ref 6–12)
POTASSIUM BLD-SCNC: 4.2 MMOL/L (ref 3.5–5.2)
PROT SERPL-MCNC: 6.7 G/DL (ref 6–8.5)
RBC # BLD AUTO: 3.96 10*6/MM3 (ref 3.77–5.28)
SODIUM BLD-SCNC: 134 MMOL/L (ref 136–145)
WBC NRBC COR # BLD: 14.84 10*3/MM3 (ref 3.4–10.8)

## 2020-01-21 PROCEDURE — 25010000002 ENOXAPARIN PER 10 MG: Performed by: COLON & RECTAL SURGERY

## 2020-01-21 PROCEDURE — 99232 SBSQ HOSP IP/OBS MODERATE 35: CPT | Performed by: HOSPITALIST

## 2020-01-21 PROCEDURE — 85027 COMPLETE CBC AUTOMATED: CPT | Performed by: PHYSICIAN ASSISTANT

## 2020-01-21 PROCEDURE — 80053 COMPREHEN METABOLIC PANEL: CPT | Performed by: PHYSICIAN ASSISTANT

## 2020-01-21 RX ADMIN — OXYCODONE HYDROCHLORIDE 5 MG: 5 TABLET ORAL at 18:17

## 2020-01-21 RX ADMIN — OXYCODONE HYDROCHLORIDE 5 MG: 5 TABLET ORAL at 22:46

## 2020-01-21 RX ADMIN — ACETAMINOPHEN 1000 MG: 500 TABLET, FILM COATED ORAL at 07:37

## 2020-01-21 RX ADMIN — DIAZEPAM 5 MG: 5 TABLET ORAL at 15:38

## 2020-01-21 RX ADMIN — ENOXAPARIN SODIUM 40 MG: 100 INJECTION SUBCUTANEOUS at 07:36

## 2020-01-21 RX ADMIN — MONTELUKAST SODIUM 10 MG: 10 TABLET, COATED ORAL at 22:46

## 2020-01-21 RX ADMIN — ACETAMINOPHEN 1000 MG: 500 TABLET, FILM COATED ORAL at 15:38

## 2020-01-21 RX ADMIN — ALVIMOPAN 12 MG: 12 CAPSULE ORAL at 07:37

## 2020-01-21 RX ADMIN — ALVIMOPAN 12 MG: 12 CAPSULE ORAL at 21:00

## 2020-01-21 RX ADMIN — OXYCODONE HYDROCHLORIDE 5 MG: 5 TABLET ORAL at 10:52

## 2020-01-21 RX ADMIN — PANTOPRAZOLE SODIUM 40 MG: 40 TABLET, DELAYED RELEASE ORAL at 06:07

## 2020-01-21 RX ADMIN — ACETAMINOPHEN 1000 MG: 500 TABLET, FILM COATED ORAL at 00:00

## 2020-01-21 RX ADMIN — OXYCODONE HYDROCHLORIDE 5 MG: 5 TABLET ORAL at 03:01

## 2020-01-21 RX ADMIN — DIAZEPAM 5 MG: 5 TABLET ORAL at 06:28

## 2020-01-21 RX ADMIN — ATORVASTATIN CALCIUM 40 MG: 40 TABLET, FILM COATED ORAL at 22:46

## 2020-01-21 RX ADMIN — AZELASTINE HYDROCHLORIDE 1 SPRAY: 137 SPRAY, METERED NASAL at 07:37

## 2020-01-21 NOTE — PROGRESS NOTES
[facility] Medicine Services  PROGRESS NOTE    Patient Name: Denise Briggs  : 1945  MRN: [id number]    Date of Admission: 2020  Primary Care Physician: Fox Braswell MD    Subjective   Subjective     CC:  Rectal Prolapse    HPI:     in room today.  Expected post op pain.  Only needing oral Oxycodone.  She has a history of chronic constipation.  No BM or flatus.  Seems quite comfortable today.  Has CVID and gets IV IG and has history of recurrent PNAs    Review of Systems    Gen- No fevers, chills  CV- No chest pain, palpitations  Resp- No cough, dyspnea  GI- No N/V/D, abd pain    Objective   Objective     Vital Signs:   Temp:  [97.9 °F (36.6 °C)-98.5 °F (36.9 °C)] 98.2 °F (36.8 °C)  Heart Rate:  [64-82] 69  Resp:  [16-18] 16  BP: (103-130)/(50-87) 115/63        Physical Exam:    Constitutional: No acute distress, awake, alert  HENT: NCAT, dry tongue  Respiratory: poor inspiratory effort, clear grossly  Cardiovascular: RRR, no murmurs, rubs, or gallops, palpable pedal pulses bilaterally  Gastrointestinal: Positive bowel sounds, soft, pain only on palpation of abdomen around incisional site.  She has Low abdomen dressing, no guarding  Musculoskeletal: No pedal edema  Psychiatric: Appropriate affect, cooperative  Neurologic: Oriented x 3, strength symmetric in all extremities, Cranial Nerves grossly intact to confrontation, speech clear  Skin: dry, + skin tenting    Results Reviewed:  Results from last 7 days   Lab Units 20  0420 01/15/20  1314   WBC 10*3/mm3 14.84* 8.66   HEMOGLOBIN g/dL 11.6* 13.2   HEMATOCRIT % 36.1 40.7   PLATELETS 10*3/mm3 241 278     Results from last 7 days   Lab Units 20  0420 01/15/20  1314   SODIUM mmol/L 134* 141   POTASSIUM mmol/L 4.2 4.4   CHLORIDE mmol/L 101 102   CO2 mmol/L 21.0* 27.0   BUN mg/dL 8 18   CREATININE mg/dL 0.57 0.82   GLUCOSE mg/dL 122* 108*   CALCIUM mg/dL 8.3* 10.1   ALT (SGPT) U/L 18  --    AST  (SGOT) U/L 18  --      Estimated Creatinine Clearance: 69.1 mL/min (by C-G formula based on SCr of 0.57 mg/dL).    Microbiology Results Abnormal     None        Imaging Results (Last 24 Hours)     ** No results found for the last 24 hours. **        I have reviewed the medications:  Scheduled Meds:  acetaminophen 1,000 mg Oral Q8H   alvimopan 12 mg Oral BID   atorvastatin 40 mg Oral Nightly   azelastine 1 spray Each Nare BID   enoxaparin 40 mg Subcutaneous Daily   montelukast 10 mg Oral Nightly   pantoprazole 40 mg Oral QAM     Continuous Infusions:  lactated ringers 9 mL/hr Last Rate: 9 mL/hr (01/20/20 0851)     PRN Meds:.diazePAM  •  diphenhydrAMINE  •  ondansetron  •  oxyCODONE    Assessment/Plan   Assessment & Plan     Active Hospital Problems    Diagnosis  POA   • **Rectal mucosa prolapse [K62.3]  Yes   • CVID (common variable immunodeficiency) (CMS/HCC) [D83.9]  Yes   • History of MRSA infection [Z86.14]  Yes   • Chronic constipation [K59.09]  Yes   • Bronchiectasis without complication (CMS/[facility]) [J47.9]  Yes   • GERD (gastroesophageal reflux disease) [K21.9]  Yes   • Mixed hyperlipidemia [E78.2]  Yes      Resolved Hospital Problems   No resolved problems to display.       Brief Hospital Course to date:  Denise Briggs is a 74 y.o. female admitted for surgery for rectal Prolapse, loss of anal sphincter tone, and chronic constipation issues.    Rectal Prolapse  - s/p OR with Dr. Vail  Chronic Constipation  - at risk for constipation after OR - IV fentanyl, IV dilaudid, opiates  Umbilical Hernia  - repaired with surgery  Pelvic Adhesions  - lysis during surgery  Post Op Ileus  - Entereg  CVID s/p IV Ig on Jan 17, 2020  - impaired immune system - at risk for post op pna  - history of bronchiectasis  - history of multiple pneumonias  - risk factor for post op respiratory issues  - encourage ambulation  - encourage incentive spirometer  - minimize narcotics for safety    Minimize narcotic  analgesia  Consider other pain mgmt options first    DVT Prophylaxis:  Lovenox SC    Disposition: I expect the patient to be discharged when improved    CODE STATUS:   Code Status and Medical Interventions:   Ordered at: 01/20/20 1601     Code Status:    CPR     Medical Interventions (Level of Support Prior to Arrest):    Full         Electronically signed by Vlad Witt MD, 01/21/20, 6:34 PM.

## 2020-01-21 NOTE — PROGRESS NOTES
"Colon and Rectal [CSGA]    POD # 1    /63 (BP Location: Left arm, Patient Position: Lying)   Pulse 69   Temp 98.2 °F (36.8 °C) (Oral)   Resp 16   Ht 175.3 cm (69.02\")   Wt 80.7 kg (178 lb)   SpO2 100%   BMI 26.27 kg/m²     Lab Results (last 24 hours)     Procedure Component Value Units Date/Time    Comprehensive Metabolic Panel [[id number]]  (Abnormal) Collected:  01/21/20 0420    Specimen:  Blood Updated:  01/21/20 0540     Glucose 122 mg/dL      BUN 8 mg/dL      Creatinine 0.57 mg/dL      Sodium 134 mmol/L      Potassium 4.2 mmol/L      Chloride 101 mmol/L      CO2 21.0 mmol/L      Calcium 8.3 mg/dL      Total Protein 6.7 g/dL      Albumin 3.40 g/dL      ALT (SGPT) 18 U/L      AST (SGOT) 18 U/L      Alkaline Phosphatase 49 U/L      Total Bilirubin 0.4 mg/dL      eGFR Non African Amer 103 mL/min/1.73      Globulin 3.3 gm/dL      A/G Ratio 1.0 g/dL      BUN/Creatinine Ratio 14.0     Anion Gap 12.0 mmol/L     Narrative:       GFR Normal >60  Chronic Kidney Disease <60  Kidney Failure <15      CBC (No Diff) [[id number]]  (Abnormal) Collected:  01/21/20 0420    Specimen:  Blood Updated:  01/21/20 0455     WBC 14.84 10*3/mm3      RBC 3.96 10*6/mm3      Hemoglobin 11.6 g/dL      Hematocrit 36.1 %      MCV 91.2 fL      MCH 29.3 pg      MCHC 32.1 g/dL      RDW 12.7 %      RDW-SD 42.3 fl      MPV 10.1 fL      Platelets 241 10*3/mm3           I/O this shift:  In: 240 [P.O.:240]  Out: 800 [Urine:800]    Alert and oriented.  No nausea or vomiting.  Good pain control  Good UO. Labs stable  No flatus or stool yet.  Stable post op course.  Pathology pending.  Order Name Source Comment Collection Info Order Time   TISSUE PATHOLOGY EXAM Large Intestine, Sigmoid Colon  Collected By: Luke Vail MD 1/20/2020 11:57 AM   .    Luke Vail MD  01/21/20  5:30 PM    "

## 2020-01-21 NOTE — PLAN OF CARE
Patient has rested comfortably this shift, pain controlled with scheduled and PRN medications.  Vital signs have been stable. Patient has denied any nausea or vomiting this shift.  Continue to monitor.

## 2020-01-21 NOTE — PROGRESS NOTES
Discharge Planning Assessment  [facility]     Patient Name: Denise Briggs  MRN: [id number]  Today's Date: 1/21/2020    Admit Date: 1/20/2020    Discharge Needs Assessment     Row Name 01/21/20 1107       Living Environment    Lives With  spouse    Current Living Arrangements  home/apartment/condo    Living Arrangement Comments  Has steps but not an issue. Spouse can assist as needed after DC.       Discharge Needs Assessment    Equipment Currently Used at Home  power chair,(recliner lift);walker, rolling;cane, straight    Equipment Needed After Discharge  none    Discharge Coordination/Progress  Has a scooter, cane and walkers available for prn use. No HH involved.        Discharge Plan     Row Name 01/21/20 1108       Plan    Plan  Home at DC    Patient/Family in Agreement with Plan  yes    Plan Comments  I spoke with the pt and spouse. No DC needs at this time.    Final Discharge Disposition Code  01 - home or self-care        Destination      Coordination has not been started for this encounter.      Durable Medical Equipment      Coordination has not been started for this encounter.      Dialysis/Infusion      Coordination has not been started for this encounter.      Home Medical Care      Coordination has not been started for this encounter.      Therapy      Coordination has not been started for this encounter.      Community Resources      Coordination has not been started for this encounter.          Demographic Summary    No documentation.       Functional Status     Row Name 01/21/20 1106       Functional Status    Usual Activity Tolerance  good       Functional Status, IADL    Medications  independent    Meal Preparation  independent    Housekeeping  independent    Laundry  independent    Shopping  independent        Psychosocial    No documentation.       Abuse/Neglect    No documentation.       Legal    No documentation.       Substance Abuse    No documentation.       Patient Forms    No  documentation.           April Soto RN

## 2020-01-21 NOTE — PLAN OF CARE
Patient ambulated 4 times today. Medicated 3 times. No bowel movement as of yet but has had urine output. Spouse at bedside assisting. Looking forward to advanced diet in the am.

## 2020-01-22 VITALS
SYSTOLIC BLOOD PRESSURE: 107 MMHG | DIASTOLIC BLOOD PRESSURE: 53 MMHG | OXYGEN SATURATION: 98 % | TEMPERATURE: 98 F | HEART RATE: 66 BPM | WEIGHT: 178 LBS | BODY MASS INDEX: 26.36 KG/M2 | RESPIRATION RATE: 18 BRPM | HEIGHT: 69 IN

## 2020-01-22 LAB
CYTO UR: NORMAL
LAB AP CASE REPORT: NORMAL
LAB AP CLINICAL INFORMATION: NORMAL
PATH REPORT.FINAL DX SPEC: NORMAL
PATH REPORT.GROSS SPEC: NORMAL

## 2020-01-22 PROCEDURE — 99239 HOSP IP/OBS DSCHRG MGMT >30: CPT | Performed by: INTERNAL MEDICINE

## 2020-01-22 PROCEDURE — 25010000002 ENOXAPARIN PER 10 MG: Performed by: COLON & RECTAL SURGERY

## 2020-01-22 RX ORDER — ACETAMINOPHEN 500 MG
1000 TABLET ORAL EVERY 8 HOURS SCHEDULED
Qty: 2 TABLET | Refills: 0 | Status: SHIPPED | OUTPATIENT
Start: 2020-01-22 | End: 2020-01-23

## 2020-01-22 RX ORDER — OXYCODONE HYDROCHLORIDE 5 MG/1
5 TABLET ORAL EVERY 4 HOURS PRN
Qty: 18 TABLET | Refills: 0 | Status: SHIPPED | OUTPATIENT
Start: 2020-01-22 | End: 2020-01-30

## 2020-01-22 RX ORDER — DIAZEPAM 5 MG/1
5 TABLET ORAL EVERY 8 HOURS PRN
Qty: 10 TABLET | Refills: 0 | Status: SHIPPED | OUTPATIENT
Start: 2020-01-22 | End: 2020-01-30

## 2020-01-22 RX ADMIN — DIAZEPAM 5 MG: 5 TABLET ORAL at 15:36

## 2020-01-22 RX ADMIN — PANTOPRAZOLE SODIUM 40 MG: 40 TABLET, DELAYED RELEASE ORAL at 07:51

## 2020-01-22 RX ADMIN — OXYCODONE HYDROCHLORIDE 5 MG: 5 TABLET ORAL at 13:01

## 2020-01-22 RX ADMIN — ENOXAPARIN SODIUM 40 MG: 100 INJECTION SUBCUTANEOUS at 07:50

## 2020-01-22 RX ADMIN — ACETAMINOPHEN 1000 MG: 500 TABLET, FILM COATED ORAL at 07:51

## 2020-01-22 RX ADMIN — AZELASTINE HYDROCHLORIDE 1 SPRAY: 137 SPRAY, METERED NASAL at 07:53

## 2020-01-22 NOTE — DISCHARGE SUMMARY
[facility] Medicine Services  DISCHARGE SUMMARY    Patient Name: Denise Briggs  : 1945  MRN: [id number]    Date of Admission: 2020  7:51 AM  Date of Discharge:  2019  Primary Care Physician: Fox Braswell MD    Consults     Date and Time Order Name Status Description    2020 1601 Inpatient Hospitalist Consult Completed           Hospital Course     Presenting Problem:   Rectal mucosa prolapse [K62.3]    Active Hospital Problems    Diagnosis  POA   • **Chronic constipation [K59.09]  Yes     Priority: High   • Bronchiectasis without complication (CMS/HCC) [J47.9]  Yes     Priority: Medium   • Rectal mucosa prolapse [K62.3]  Yes   • 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          Hospital Course:  Denise Briggs is a 74 y.o. female with history of chronic constipation and rectal prolapse.  She was admitted for left hemicolectomy.  This was done on  without complication.  Lysis of adhesions in the cul de sac was extensive.  She recovered without incident, had a BM, tolerated food.  She is going home with Percocet 5mg #18 and Valium 5mg #10 for spasms.      Due to hisotry of recurrent pneumonias and CVID, she got gammglobulin a few days prior to surgery and has been encouraged to walk and cough and use incentive spirometer in the postop period.       Discharge Follow Up Recommendations for outpatient labs/diagnostics:   *pathology pending.  FU with Dr. Vail at postop visit    Day of Discharge     HPI:   Toelrating food without difficulty.  No pain at rest but hurts when she walks.     Review of Systems  No fevers  No dyspnea or cough.  Using IS.   No chest pain  No n/v  Voiding without difficulty.     Vital Signs:    Temp:  [98 °F (36.7 °C)] 98 °F (36.7 °C)  Heart Rate:  [63-70] 66  Resp:  [16-18] 18  BP: (106-111)/(53-59) 107/53     Physical Exam:  Gen:  WD/WN womanin bed, NAD.  Eating.   Neuro: alert and oriented, clear speech, follows commands, grossly nonfocal  HEENT:  NC/AT PERRL, OP benign  Neck:  Supple, no LAD  Heart RRR no murmur, rub, or gallop  Lungs CTA nonlabored.  On RA.   Abd:  Soft, appropr tender.  Midline incision is dressed; no active bleeding seen through dressing.  No rebound or guarding, pos BS  Extrem:  No c/c/e      Pertinent  and/or Most Recent Results     Results from last 7 days   Lab Units 01/21/20  0420   WBC 10*3/mm3 14.84*   HEMOGLOBIN g/dL 11.6*   HEMATOCRIT % 36.1   PLATELETS 10*3/mm3 241   SODIUM mmol/L 134*   POTASSIUM mmol/L 4.2   CHLORIDE mmol/L 101   CO2 mmol/L 21.0*   BUN mg/dL 8   CREATININE mg/dL 0.57   GLUCOSE mg/dL 122*   CALCIUM mg/dL 8.3*     Results from last 7 days   Lab Units 01/21/20  0420   BILIRUBIN mg/dL 0.4   ALK PHOS U/L 49   ALT (SGPT) U/L 18   AST (SGOT) U/L 18           Invalid input(s): TG, LDLCALC, LDLREALC        Brief Urine Lab Results     None          Microbiology Results Abnormal     None          Imaging Results (All)     None                         Plan for Follow-up of Pending Labs/Results: Dr. Vail to see in 10 days.     Discharge Details        Discharge Medications      New Medications      Instructions Start Date   acetaminophen 500 MG tablet  Commonly known as:  TYLENOL   1,000 mg, Oral, Every 8 Hours Scheduled      diazePAM 5 MG tablet  Commonly known as:  VALIUM   5 mg, Oral, Every 8 Hours PRN      oxyCODONE 5 MG immediate release tablet  Commonly known as:  ROXICODONE   5 mg, Oral, Every 4 Hours PRN         Continue These Medications      Instructions Start Date   atorvastatin 40 MG tablet  Commonly known as:  LIPITOR   40 mg, Oral, Nightly      azelastine 0.1 % nasal spray  Commonly known as:  ASTELIN   1 spray, Nasal, 2 Times Daily, Use in each  nostril as directed       cholecalciferol 25 MCG (1000 UT) tablet  Commonly known as:  VITAMIN D3   1,000 Units, Oral, Daily      diphenhydrAMINE 25 mg capsule  Commonly known as:  BENADRYL   25 mg, Oral, Nightly PRN      estradiol 0.075 MG/24HR patch  Commonly known as:  MINIVELLE, VIVELLE-DOT   1 patch, Transdermal, 2 Times Weekly, Change on Tuesday and Friday       montelukast 10 MG tablet  Commonly known as:  SINGULAIR   10 mg, Oral, Nightly      MULTIVITAMIN ADULT PO   1 tablet/day, Oral, Daily      omeprazole 40 MG capsule  Commonly known as:  priLOSEC   40 mg, Oral, Daily      vitamin b complex capsule capsule   1 capsule, Oral, Daily         Stop These Medications    diclofenac 75 MG EC tablet  Commonly known as:  VOLTAREN            Allergies   Allergen Reactions   • Mushroom Nausea And Vomiting   • Penicillins Rash         Discharge Disposition:  Home or Self Care    Diet:  GI soft.     Activity:  Walk several times daily.  Use incentive spirometer every 2-3 hours.  Give several strong coughs per hour.       Restrictions or Other Recommendations:  Surgical restrictions as per Dr. Vail:  No lifting over 10 lbs, no driving while on sedating meds, etc.       CODE STATUS:    Code Status and Medical Interventions:   Ordered at: 01/20/20 1601     Code Status:    CPR     Medical Interventions (Level of Support Prior to Arrest):    Full       Future Appointments   Date Time Provider Department Center   3/23/2020 10:40 AM [facility]  [facility]       Follow up with Dr Vail in 10 days.     Time Spent on Discharge:  45 minutes    Electronically signed by Rebeka Soria MD, 01/22/20, 5:38 PM.

## 2020-01-22 NOTE — PROGRESS NOTES
[facility] Medicine Services  PROGRESS NOTE    Patient Name: Denise Briggs  : 1945  MRN: [id number]    Date of Admission: 2020  Primary Care Physician: Fox Braswell MD    Subjective   Subjective     CC:  Rectal Prolapse    HPI:   She had a liquid stool and has tolerated full liquids.  Using IS and ambulating.  No dyspnea or cough.     Review of Systems    Gen- No fevers, chills  CV- No chest pain, palpitations  Resp- No cough, dyspnea  GI- No N/V/D, abd pain.  Abd does hurt when she gets up.     Objective   Objective     Vital Signs:   Temp:  [98 °F (36.7 °C)-98.2 °F (36.8 °C)] 98 °F (36.7 °C)  Heart Rate:  [63-70] 66  Resp:  [16-18] 18  BP: (103-115)/(53-63) 107/53        Physical Exam:    Constitutional: No acute distress, awake, alert, husb in room.   HENT: NCAT  Respiratory:  CTA nonlabored, no wheeze.  Not coughing .  Cardiovascular: RRR, no murmurs, rubs, or gallops, palpable pedal pulses bilaterally  Gastrointestinal: Positive bowel sounds, soft, minimal tenderness.  Dressing not removed.    Musculoskeletal: No pedal edema  Psychiatric: Appropriate affect, cooperative  Neurologic: Oriented x 3, strength symmetric in all extremities, Cranial Nerves grossly intact to confrontation, speech clear  Skin: warm and dry.     Results Reviewed:  Results from last 7 days   Lab Units 20  0420 01/15/20  1314   WBC 10*3/mm3 14.84* 8.66   HEMOGLOBIN g/dL 11.6* 13.2   HEMATOCRIT % 36.1 40.7   PLATELETS 10*3/mm3 241 278     Results from last 7 days   Lab Units 20  0420 01/15/20  1314   SODIUM mmol/L 134* 141   POTASSIUM mmol/L 4.2 4.4   CHLORIDE mmol/L 101 102   CO2 mmol/L 21.0* 27.0   BUN mg/dL 8 18   CREATININE mg/dL 0.57 0.82   GLUCOSE mg/dL 122* 108*   CALCIUM mg/dL 8.3* 10.1   ALT (SGPT) U/L 18  --    AST (SGOT) U/L 18  --      Estimated Creatinine Clearance: 69.1 mL/min (by C-G formula based on SCr of 0.57 mg/dL).    Microbiology Results Abnormal      None        Imaging Results (Last 24 Hours)     ** No results found for the last 24 hours. 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       Brief Hospital Course to date:  eDnise Briggs is a 74 y.o. female admitted for L hemocolectomy for rectal Prolapse, loss of anal sphincter tone, and chronic constipation issues.    Chronic constip and rectal prolapse   POD 2 from L hemicol with GERARDO  - ambulate  - pain control  - Entereg  - adv diet per surg recs    CVID s/p IV Ig on Jan 17, 2020  - impaired immune system - at risk for post op pna  - history of bronchiectasis, multiple pneumonias  - IS and ambulation etc    Minimize narcotic analgesia  Consider other pain mgmt options first    DVT Prophylaxis:  Lovenox SC    Disposition: I expect the patient to be discharged soon    CODE STATUS:   Code Status and Medical Interventions:   Ordered at: 01/20/20 1601     Code Status:    CPR     Medical Interventions (Level of Support Prior to Arrest):    Full         Electronically signed by Rebeka Soria MD, 01/22/20, 9:00 AM.

## 2020-01-22 NOTE — PLAN OF CARE
Problem: Patient Care Overview  Goal: Plan of Care Review  Outcome: Ongoing (interventions implemented as appropriate)  Goal: Individualization and Mutuality  Outcome: Ongoing (interventions implemented as appropriate)  Goal: Discharge Needs Assessment  Outcome: Ongoing (interventions implemented as appropriate)  Goal: Interprofessional Rounds/Family Conf  Outcome: Ongoing (interventions implemented as appropriate)     Problem: Pain, Chronic (Adult)  Goal: Identify Related Risk Factors and Signs and Symptoms  Outcome: Ongoing (interventions implemented as appropriate)  Goal: Acceptable Pain/Comfort Level and Functional Ability  Outcome: Ongoing (interventions implemented as appropriate)     Problem: Bowel Resection (Adult)  Goal: Signs and Symptoms of Listed Potential Problems Will be Absent, Minimized or Managed (Bowel Resection)  Outcome: Ongoing (interventions implemented as appropriate)  Goal: Anesthesia/Sedation Recovery  Outcome: Ongoing (interventions implemented as appropriate)

## 2020-01-23 ENCOUNTER — READMISSION MANAGEMENT (OUTPATIENT)
Dept: CALL CENTER | Facility: HOSPITAL | Age: 75
End: 2020-01-23

## 2020-01-23 NOTE — OUTREACH NOTE
Prep Survey      Responses   Facility patient discharged from?  [place]   Is patient eligible?  Yes   Discharge diagnosis  chronic constipation,  left hemicolectomy   Does the patient have one of the following disease processes/diagnoses(primary or secondary)?  General Surgery   Does the patient have Home health ordered?  No   Is there a DME ordered?  No   Comments regarding appointments  see AVS   Prep survey completed?  Yes          Amita Wells RN

## 2020-01-24 ENCOUNTER — READMISSION MANAGEMENT (OUTPATIENT)
Dept: CALL CENTER | Facility: HOSPITAL | Age: 75
End: 2020-01-24

## 2020-01-24 NOTE — OUTREACH NOTE
General Surgery Week 1 Survey      Responses   Facility patient discharged from?  [place]   Does the patient have one of the following disease processes/diagnoses(primary or secondary)?  General Surgery   Is there a successful TCM telephone encounter documented?  No   Week 1 attempt successful?  Yes   Call start time  0947   Call end time  0956   Discharge diagnosis  chronic constipation,  left hemicolectomy   Meds reviewed with patient/caregiver?  Yes   Is the patient having any side effects they believe may be caused by any medication additions or changes?  No   Does the patient have all medications related to this admission filled (includes all antibiotics, pain medications, etc.)  Yes   Is the patient taking all medications as directed (includes completed medication regime)?  Yes   Medication comments  Pain at worst been 10/10 then after med 5/10   Psychosocial issues?  No   Comments  Pt denies N/V, have had watery, black stool yesterday. pt still having significant pain w/ambulation but is forcing herself to get up several times daily &use the I.S. Denies s/sx of infection. Appetite is decreased, eating small frequent, soft/bland foods per her report. No issues urinating.    Did the patient receive a copy of their discharge instructions?  Yes   Nursing interventions  Reviewed instructions with patient   What is the patient's perception of their health status since discharge?  Same   Nursing interventions  Nurse provided patient education   Is the patient /caregiver able to teach back basic post-op care?  Continue use of incentive spirometry at least 1 week post discharge, Practice 'cough and deep breath', Take showers only when approved by MD-sponge bathe until then   Is the patient/caregiver able to teach back signs and symptoms of incisional infection?  Increased redness, swelling or pain at the incisonal site, Fever, Incisional warmth   Is the patient/caregiver able to teach back steps to recovery at home?   Set small, achievable goals for return to baseline health, Eat a well-balance diet   Is the patient/caregiver able to teach back the hierarchy of who to call/visit for symptoms/problems? PCP, Specialist, Home health nurse, Urgent Care, ED, 911  Yes   Week 1 call completed?  Yes          Kendra Valencia RN

## 2020-01-31 ENCOUNTER — READMISSION MANAGEMENT (OUTPATIENT)
Dept: CALL CENTER | Facility: HOSPITAL | Age: 75
End: 2020-01-31

## 2020-01-31 NOTE — OUTREACH NOTE
General Surgery Week 2 Survey      Responses   Facility patient discharged from?  [place]   Does the patient have one of the following disease processes/diagnoses(primary or secondary)?  General Surgery   Week 2 attempt successful?  Yes   Call start time  1646   Call end time  1648   Discharge diagnosis  chronic constipation,  left hemicolectomy   Meds reviewed with patient/caregiver?  Yes   Is the patient taking all medications as directed (includes completed medication regime)?  Yes   Does the patient have a follow up appointment scheduled with their surgeon?  Yes [2/5/20]   Has the patient kept scheduled appointments due by today?  N/A   Psychosocial issues?  No   What is the patient's perception of their health status since discharge?  Improving   Nursing interventions  Nurse provided patient education   Is the patient /caregiver able to teach back basic post-op care?  Lifting as instructed by MD in discharge instructions, Drive as instructed by MD in discharge instructions   Is the patient/caregiver able to teach back signs and symptoms of incisional infection?  Fever   Is the patient/caregiver able to teach back steps to recovery at home?  Set small, achievable goals for return to baseline health, Rest and rebuild strength, gradually increase activity   If the patient is a current smoker, are they able to teach back resources for cessation?  -- [Nonsmoker]   Week 2 call completed?  Yes          Cassandra Russo RN

## 2020-02-08 ENCOUNTER — READMISSION MANAGEMENT (OUTPATIENT)
Dept: CALL CENTER | Facility: HOSPITAL | Age: 75
End: 2020-02-08

## 2020-02-08 NOTE — OUTREACH NOTE
"General Surgery Week 3 Survey      Responses   Facility patient discharged from?  [place]   Does the patient have one of the following disease processes/diagnoses(primary or secondary)?  General Surgery   Week 3 attempt successful?  Yes   Call start time  1521   Call end time  1551   Discharge diagnosis  chronic constipation,  left hemicolectomy   Meds reviewed with patient/caregiver?  Yes   Is the patient taking all medications as directed (includes completed medication regime)?  Yes   Does the patient have a follow up appointment scheduled with their surgeon?  Yes [2/6/20. 2nd f/u with surgeon 2/27/20.]   Has the patient kept scheduled appointments due by today?  Yes   Psychosocial issues?  No   What is the patient's perception of their health status since discharge?  Improving [Pt reports \"I'm very depressed\". Pt reports the surgeon was VERY upset with her at f/u visit on 2/6/20 because she still had the bandage on her incision. Patient is VERY tearful at the time. ]   Nursing interventions  Nurse provided patient education   Is the patient /caregiver able to teach back basic post-op care?  Keep incision areas clean,dry and protected, Take showers only when approved by MD-sponge bathe until then, No tub bath, swimming, or hot tub until instructed by MD, Lifting as instructed by MD in discharge instructions, Drive as instructed by MD in discharge instructions   Is the patient/caregiver able to teach back signs and symptoms of incisional infection?  Increased redness, swelling or pain at the incisonal site, Increased drainage or bleeding, Incisional warmth, Pus or odor from incision, Fever   Is the patient/caregiver able to teach back steps to recovery at home?  Make a list of questions for surgeon's appointment, Eat a well-balance diet   Week 3 call completed?  Yes          Cassandra Russo RN  "

## 2020-02-17 ENCOUNTER — READMISSION MANAGEMENT (OUTPATIENT)
Dept: CALL CENTER | Facility: HOSPITAL | Age: 75
End: 2020-02-17

## 2020-02-17 NOTE — OUTREACH NOTE
General Surgery Week 4 Survey      Responses   Facility patient discharged from?  [place]   Does the patient have one of the following disease processes/diagnoses(primary or secondary)?  General Surgery   Week 4 attempt successful?  Yes   Call start time  1048   Call end time  1049   Discharge diagnosis  chronic constipation,  left hemicolectomy   Is the patient taking all medications as directed (includes completed medication regime)?  Yes   Has the patient kept scheduled appointments due by today?  Yes   Is the patient still receiving Home Health Services?  N/A   Psychosocial issues?  No   What is the patient's perception of their health status since discharge?  Improving   Nursing interventions  Nurse provided patient education   Is the patient/caregiver able to teach back steps to recovery at home?  Make a list of questions for surgeon's appointment, Eat a well-balance diet   Is the patient/caregiver able to teach back the hierarchy of who to call/visit for symptoms/problems? PCP, Specialist, Home health nurse, Urgent Care, ED, 911  Yes   Week 4 call completed?  Yes   Would the patient like one additional call?  No   Graduated  Yes          Luke Kamara RN

## 2020-03-23 ENCOUNTER — APPOINTMENT (OUTPATIENT)
Dept: MAMMOGRAPHY | Facility: HOSPITAL | Age: 75
End: 2020-03-23

## 2022-11-30 ENCOUNTER — TRANSCRIBE ORDERS (OUTPATIENT)
Dept: ADMINISTRATIVE | Facility: HOSPITAL | Age: 77
End: 2022-11-30

## 2022-11-30 DIAGNOSIS — Z12.31 VISIT FOR SCREENING MAMMOGRAM: Primary | ICD-10-CM

## 2023-01-10 ENCOUNTER — HOSPITAL ENCOUNTER (OUTPATIENT)
Dept: MAMMOGRAPHY | Facility: HOSPITAL | Age: 78
Discharge: HOME | End: 2023-01-10
Admitting: OBSTETRICS & GYNECOLOGY
Payer: MEDICARE

## 2023-01-10 DIAGNOSIS — Z12.31 VISIT FOR SCREENING MAMMOGRAM: ICD-10-CM

## 2023-01-10 PROCEDURE — 77063 BREAST TOMOSYNTHESIS BI: CPT

## 2023-01-10 PROCEDURE — 77063 BREAST TOMOSYNTHESIS BI: CPT | Performed by: RADIOLOGY

## 2023-01-10 PROCEDURE — 77067 SCR MAMMO BI INCL CAD: CPT | Performed by: RADIOLOGY

## 2023-01-10 PROCEDURE — 77067 SCR MAMMO BI INCL CAD: CPT

## 2023-11-01 ENCOUNTER — OFFICE VISIT (OUTPATIENT)
Dept: FAMILY MEDICINE CLINIC | Facility: CLINIC | Age: 78
End: 2023-11-01
Payer: MEDICARE

## 2023-11-01 VITALS
HEART RATE: 98 BPM | BODY MASS INDEX: 25.62 KG/M2 | HEIGHT: 69 IN | WEIGHT: 173 LBS | DIASTOLIC BLOOD PRESSURE: 64 MMHG | OXYGEN SATURATION: 96 % | SYSTOLIC BLOOD PRESSURE: 122 MMHG

## 2023-11-01 DIAGNOSIS — M79.18 CHRONIC MUSCULOSKELETAL PAIN: ICD-10-CM

## 2023-11-01 DIAGNOSIS — G89.29 CHRONIC MUSCULOSKELETAL PAIN: ICD-10-CM

## 2023-11-01 DIAGNOSIS — D81.9 COMBINED VARIABLE IMMUNODEFICIENCY: Primary | Chronic | ICD-10-CM

## 2023-11-01 DIAGNOSIS — K21.9 GASTROESOPHAGEAL REFLUX DISEASE WITHOUT ESOPHAGITIS: ICD-10-CM

## 2023-11-01 DIAGNOSIS — J31.0 NON-ALLERGIC RHINITIS: ICD-10-CM

## 2023-11-01 DIAGNOSIS — J47.9 BRONCHIECTASIS WITHOUT COMPLICATION: Chronic | ICD-10-CM

## 2023-11-01 DIAGNOSIS — E55.9 VITAMIN D DEFICIENCY: ICD-10-CM

## 2023-11-01 DIAGNOSIS — E78.2 MIXED HYPERLIPIDEMIA: ICD-10-CM

## 2023-11-01 PROBLEM — I95.0 IDIOPATHIC HYPOTENSION: Status: ACTIVE | Noted: 2023-02-17

## 2023-11-01 PROBLEM — M53.86 SCIATICA OF RIGHT SIDE ASSOCIATED WITH DISORDER OF LUMBAR SPINE: Status: ACTIVE | Noted: 2023-02-17

## 2023-11-01 PROBLEM — M48.07 LUMBOSACRAL STENOSIS WITH NEUROGENIC CLAUDICATION: Chronic | Status: ACTIVE | Noted: 2020-09-14

## 2023-11-01 PROBLEM — M51.369 DDD (DEGENERATIVE DISC DISEASE), LUMBAR: Status: ACTIVE | Noted: 2018-10-12

## 2023-11-01 PROBLEM — D80.6: Status: ACTIVE | Noted: 2020-01-20

## 2023-11-01 PROBLEM — R55 PRE-SYNCOPE: Status: RESOLVED | Noted: 2021-08-24 | Resolved: 2023-11-01

## 2023-11-01 PROBLEM — M51.36 DDD (DEGENERATIVE DISC DISEASE), LUMBAR: Status: ACTIVE | Noted: 2018-10-12

## 2023-11-01 PROBLEM — E78.5 DYSLIPIDEMIA (HIGH LDL; LOW HDL): Status: ACTIVE | Noted: 2023-02-17

## 2023-11-01 PROBLEM — F32.0 MAJOR DEPRESSIVE DISORDER, SINGLE EPISODE, MILD: Chronic | Status: ACTIVE | Noted: 2021-08-24

## 2023-11-01 PROBLEM — K58.9 IRRITABLE BOWEL SYNDROME (IBS): Status: ACTIVE | Noted: 2018-12-14

## 2023-11-01 PROBLEM — N95.1 MENOPAUSAL SYNDROME (HOT FLASHES): Status: ACTIVE | Noted: 2017-04-12

## 2023-11-01 RX ORDER — MONTELUKAST SODIUM 10 MG/1
10 TABLET ORAL NIGHTLY
Qty: 90 TABLET | Refills: 3 | Status: SHIPPED | OUTPATIENT
Start: 2023-11-01

## 2023-11-01 RX ORDER — ATORVASTATIN CALCIUM 40 MG/1
40 TABLET, FILM COATED ORAL NIGHTLY
Qty: 90 TABLET | Refills: 3 | Status: SHIPPED | OUTPATIENT
Start: 2023-11-01

## 2023-11-01 RX ORDER — OMEPRAZOLE 40 MG/1
40 CAPSULE, DELAYED RELEASE ORAL DAILY
Qty: 90 CAPSULE | Refills: 3 | Status: SHIPPED | OUTPATIENT
Start: 2023-11-01

## 2023-11-01 RX ORDER — AZITHROMYCIN 250 MG/1
250 TABLET, FILM COATED ORAL SEE ADMIN INSTRUCTIONS
COMMUNITY

## 2023-11-01 RX ORDER — MELATONIN
1000 DAILY
Qty: 90 TABLET | Refills: 3 | Status: SHIPPED | OUTPATIENT
Start: 2023-11-01

## 2023-11-01 RX ORDER — DICLOFENAC SODIUM 75 MG/1
75 TABLET, DELAYED RELEASE ORAL 2 TIMES DAILY
Qty: 180 TABLET | Refills: 3 | Status: SHIPPED | OUTPATIENT
Start: 2023-11-01

## 2023-11-01 RX ORDER — MULTIPLE VITAMINS W/ MINERALS TAB 9MG-400MCG
1 TAB ORAL DAILY
Qty: 90 TABLET | Refills: 3 | Status: SHIPPED | OUTPATIENT
Start: 2023-11-01 | End: 2023-11-06

## 2023-11-01 RX ORDER — AZELASTINE 1 MG/ML
1 SPRAY, METERED NASAL 2 TIMES DAILY
Qty: 3 EACH | Refills: 3 | Status: SHIPPED | OUTPATIENT
Start: 2023-11-01

## 2023-11-01 NOTE — PROGRESS NOTES
New Patient Office Visit      Date: 2023   Patient Name: Denise Briggs  : 1945   MRN: [id number]     Chief Complaint:    Chief Complaint   Patient presents with    new patient preventative medicine service       History of Present Illness: Denise Briggs is a 77 y.o. female who is here today to establish care.  She states her previous PCP was in private.  She currently lives at home with her .  She is retired, she is previously a schoolteacher and worked in the [facility] area with a [employer] service.    Patient does have an extensive medical history.  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  She is currently on a regimen of immunoglobulin therapy azithromycin, and having success with this.  She also has chronic bronchiectasis and is prescribed Singulair and omeprazole.  Patient also has a history of myasthenia gravis and is currently controlled on pyridostigmine  which is prescribed by her gastroenterologist.  Patient also has a diagnosis of orthostatic hypotension.     Patient has a history of hyperlipidemia and is currently well controlled on Lipitor.  She reports she did have a lipid panel completed in the past year.     She has a history of chronic back pain and undergone extensive work-up.  She does have recent MRI imaging of her lumbar spine.  She was following with a PM&R physician and underwent a localized steroid injection which did exacerbate her symptoms.  Patient is currently taking diclofenac daily and Tylenol as needed for her back pain.  Patient reports she is not interested in narcotic medications for back pain.    Patient does report a personal history of major depressive disorder.  She was previously on a variety of medications, most recently Wellbutrin.  She reports that she did not tolerate these medications.  She does have a history of seeing psychiatrist and undergoing talk therapy.  Patient reports she did not  see much personal benefit from this.  She is having increased stressors related to the health of her  at this time.    Patient reports she is up-to-date on her vaccinations and did receive these at [facility].  She does have a previous history of endometriosis status post hysterectomy.  She also has a history of rectal prolapse.  Due to thinning of her pelvic floor she is recommended to be on estrogen therapy chronically, which is prescribed by gynecology.  she is up-to-date on her cancer screenings.  She was scheduled to get a DEXA scan but was told she could not get this for some reason    Subjective      Review of Systems:   Review of Systems   Gastrointestinal:  Positive for GERD.   Musculoskeletal:  Positive for back pain.   Psychiatric/Behavioral:  The patient is nervous/anxious.    All other systems reviewed and are negative.      Past Medical History:   Past Medical History:   Diagnosis Date    Arthritis     Bronchiectasis     Constipation     CVID (common variable immunodeficiency)     has infusions monthly and follows up with Dr. Paul    Elevated cholesterol     GERD (gastroesophageal reflux disease)     MRSA infection     ~ 2016 after laminectomy.  Debreded incision and tx with oral abx, followed by infection disease at that time.      Pneumonia     x 20 occurences per pt report, last issue around 2010    Pre-syncope 08/24/2021    Last Assessment & Plan: Formatting of this note might be different from the original. - Evaluated in ED in August and told she had orthostatic hypotension at that time, etiology unknown - She believes pre-syncope could be related to medications, recently stopped taking Wellbutrin - The only medication on her list that is potentially contributing is oxybutynin, discussed this with her but she elect    Rectal prolapse        Past Surgical History:   Past Surgical History:   Procedure Laterality Date    ABDOMINAL SURGERY      r/t endometriosis    APPENDECTOMY       BRONCHOSCOPY      x2    COLON RESECTION Left 1/20/2020    Procedure: EXTEND LEFT HEMICOLECTOMY WITH RECTOPEXY;  Surgeon: Luke Vail MD;  Location: [facility] OR;  Service: General    COLONOSCOPY      HYSTERECTOMY      LUMBAR LAMINECTOMY      LUMBAR WOUND DEBRIDEMENT      do to MRSA after laminectomy ~ 2016    OOPHORECTOMY      RECTAL PROLAPSE REPAIR      REDUCTION MAMMAPLASTY Bilateral 1975       Family History:   Family History   Problem Relation Age of Onset    Breast cancer Sister 50    Ovarian cancer Neg Hx        Social History:   Social History     Socioeconomic History    Marital status:    Tobacco Use    Smoking status: Never    Smokeless tobacco: Never   Substance and Sexual Activity    Alcohol use: Yes     Comment: rare    Drug use: Never    Sexual activity: Defer       Medications:     Current Outpatient Medications:     atorvastatin (LIPITOR) 40 MG tablet, Take 1 tablet by mouth Every Night., Disp: 90 tablet, Rfl: 3    azelastine (ASTELIN) 0.1 % nasal spray, 1 spray into the nostril(s) as directed by provider 2 (Two) Times a Day. Use in each nostril as directed, Disp: 3 each, Rfl: 3    cholecalciferol (VITAMIN D3) 25 MCG (1000 UT) tablet, Take 1 tablet by mouth Daily., Disp: 90 tablet, Rfl: 3    Diclofenac Sodium (VOLTAREN) 1 % gel gel, 4 g., Disp: , Rfl:     estradiol (MINIVELLE, VIVELLE-DOT) 0.075 MG/24HR patch, Place 1 patch on the skin as directed by provider 2 (Two) Times a Week. Change on Tuesday and Friday, Disp: , Rfl:     Immune Globulin, Human, 0.5 GM/10ML solution, Infuse 60 mg into a venous catheter., Disp: , Rfl:     montelukast (SINGULAIR) 10 MG tablet, Take 1 tablet by mouth Every Night., Disp: 90 tablet, Rfl: 3    multivitamin with minerals (MULTIVITAMIN ADULT PO), Take 1 tablet by mouth Daily., Disp: 90 tablet, Rfl: 3    omeprazole (priLOSEC) 40 MG capsule, Take 1 capsule by mouth Daily., Disp: 90 capsule, Rfl: 3    azithromycin (ZITHROMAX) 250 MG tablet, Take 1 tablet by mouth  "See Admin Instructions. Take 1 tablet by mouth on Monday, Wednesday, and Friday, Disp: , Rfl:     diclofenac (VOLTAREN) 75 MG EC tablet, Take 1 tablet by mouth 2 (Two) Times a Day., Disp: 180 tablet, Rfl: 3    Allergies:   Allergies   Allergen Reactions    Mushroom Nausea And Vomiting    Shiitake Mushroom Rash and Other (See Comments)    Codeine Nausea Only     vomiting    Meperidine Rash, Other (See Comments) and Unknown (See Comments)     convulsions      Other reaction(s): Unknown   convulsions    Other reaction(s): Unknown   convulsions    Penicillins Rash    Pseudoephedrine Unknown (See Comments)     Other reaction(s): Unknown       Objective     Physical Exam:  Vital Signs:   Vitals:    11/01/23 1346   BP: 122/64   Pulse: 98   SpO2: 96%   Weight: 78.5 kg (173 lb)   Height: 175.3 cm (69.02\")     Body mass index is 25.53 kg/m².  BMI is >= 25 and <30. (Overweight) The following options were offered after discussion;: weight loss educational material (shared in after visit summary)       Physical Exam  Vitals and nursing note reviewed.   Constitutional:       Appearance: Normal appearance. She is normal weight.   HENT:      Head: Normocephalic and atraumatic.      Nose: Nose normal.      Mouth/Throat:      Mouth: Mucous membranes are moist.   Eyes:      Extraocular Movements: Extraocular movements intact.      Pupils: Pupils are equal, round, and reactive to light.   Cardiovascular:      Rate and Rhythm: Normal rate and regular rhythm.   Pulmonary:      Effort: Pulmonary effort is normal.      Breath sounds: Normal breath sounds.   Abdominal:      General: Abdomen is flat.   Musculoskeletal:         General: Normal range of motion.      Cervical back: Normal range of motion.   Skin:     General: Skin is warm.   Neurological:      General: No focal deficit present.      Mental Status: She is alert and oriented to person, place, and time.   Psychiatric:         Mood and Affect: Mood normal.         Behavior: " Behavior normal.         Thought Content: Thought content normal.         Judgment: Judgment normal.         Procedures    Results:   PHQ-9 Total Score:            Assessment / Plan      Assessment/Plan:   Diagnoses and all orders for this visit:    1. Combined variable immunodeficiency (Primary)  Assessment & Plan:  Continue current regimen prescribed by immunology.  Continue azithromycin as directed.      2. Mixed hyperlipidemia  Overview:  Last Assessment & Plan: Formatting of this note might be different from the original. - Refilled atorvastatin - Repeat lipid panel prior to annual visit Formatting of this note might be different from the original. Last Assessment & Plan: Formatting of this note might be different from the original. - Refilled atorvastatin - Repeat lipid panel prior to annual visit    Assessment & Plan:  Lipid abnormalities are improving with treatment.  Pharmacotherapy as ordered.  Lipids will be reassessed in 6 months.    Orders:  -     atorvastatin (LIPITOR) 40 MG tablet; Take 1 tablet by mouth Every Night.  Dispense: 90 tablet; Refill: 3    3. Non-allergic rhinitis  Assessment & Plan:  Continue azelastine    Orders:  -     azelastine (ASTELIN) 0.1 % nasal spray; 1 spray into the nostril(s) as directed by provider 2 (Two) Times a Day. Use in each nostril as directed  Dispense: 3 each; Refill: 3    4. Gastroesophageal reflux disease without esophagitis  Overview:  Last Assessment & Plan: Formatting of this note might be different from the original. Symptoms well controlled on omeprazole, continue Formatting of this note might be different from the original. Last Assessment & Plan: Formatting of this note might be different from the original. Symptoms well controlled on omeprazole, continue    Assessment & Plan:  Continue PPI    Orders:  -     omeprazole (priLOSEC) 40 MG capsule; Take 1 capsule by mouth Daily.  Dispense: 90 capsule; Refill: 3    5. Vitamin D deficiency  Assessment &  Plan:  Continue supplementation    Orders:  -     multivitamin with minerals (MULTIVITAMIN ADULT PO); Take 1 tablet by mouth Daily.  Dispense: 90 tablet; Refill: 3  -     cholecalciferol (VITAMIN D3) 25 MCG (1000 UT) tablet; Take 1 tablet by mouth Daily.  Dispense: 90 tablet; Refill: 3    6. Chronic musculoskeletal pain  Overview:  Last Assessment & Plan: Formatting of this note might be different from the original. - Has diffuse chronic MSK pain but currently with severe right hip pain concerning for sciatica vs muscular etiology - Complicated by known DDD, spinal surgeries - Not interested in PT -- tried 5 years ago without relief - Will refer to PM&R for rehab and further evaluation Formatting of this note might be different from the original. Last Assessment & Plan: Formatting of this note might be different from the original. - Has diffuse chronic MSK pain but currently with severe right hip pain concerning for sciatica vs muscular etiology - Complicated by known DDD, spinal surgeries - Not interested in PT -- tried 5 years ago without relief - Will refer to PM&R for rehab and further evaluation    Assessment & Plan:  Continue current regimen, will obtain records of previous interventions.    Orders:  -     diclofenac (VOLTAREN) 75 MG EC tablet; Take 1 tablet by mouth 2 (Two) Times a Day.  Dispense: 180 tablet; Refill: 3    7. Bronchiectasis without complication  Overview:  Last Assessment & Plan: Formatting of this note might be different from the original. - Follows with pulm - Refilled montelukast Formatting of this note might be different from the original. Last Assessment & Plan: Formatting of this note might be different from the original. - Follows with pulm - Refilled montelukast    Assessment & Plan:  Continue current regimen    Orders:  -     montelukast (SINGULAIR) 10 MG tablet; Take 1 tablet by mouth Every Night.  Dispense: 90 tablet; Refill: 3         Follow Up:   Return in about 3 months (around  2/1/2024) for Recheck.    Carol Barbosa DO   [facility]

## 2023-11-06 ENCOUNTER — TELEPHONE (OUTPATIENT)
Dept: FAMILY MEDICINE CLINIC | Facility: CLINIC | Age: 78
End: 2023-11-06
Payer: MEDICARE

## 2023-11-06 DIAGNOSIS — E55.9 VITAMIN D DEFICIENCY: ICD-10-CM

## 2023-11-06 RX ORDER — MULTIPLE VITAMINS W/ MINERALS TAB 9MG-400MCG
1 TAB ORAL DAILY
Qty: 90 EACH | Refills: 3 | Status: SHIPPED | OUTPATIENT
Start: 2023-11-06

## 2023-11-06 NOTE — TELEPHONE ENCOUNTER
Pharmacy Name:     [facility] - [place], [place] - [address] [phone] [facility] [phone]      REFERENCE NUMBER:    [id number]    Pharmacy representative name:     LUBA    What medication are you calling in regards to:     THERA M PLUS TABLET    What question does the pharmacy have:     PHARMACY MADE CONTACT TO REQUEST A VERBAL CONFIRMATION OF QUANTITY FOR MEDICATION    IT WAS ALSO NOTED MEDICATION IS NOT LISTED ON PATIENT'S CHART    Who is the provider that prescribed the medication:     DR CUNNINGHAM

## 2023-11-06 NOTE — TELEPHONE ENCOUNTER
Carol Barbosa, DO  You50 minutes ago (12:14 PM)       Patient's thera M plus is equivalent to the multivitamin listed on her chart.  I resent the prescription to [facility].

## 2023-11-07 ENCOUNTER — TELEPHONE (OUTPATIENT)
Dept: FAMILY MEDICINE CLINIC | Facility: CLINIC | Age: 78
End: 2023-11-07
Payer: MEDICARE

## 2023-11-07 NOTE — TELEPHONE ENCOUNTER
Pharmacy Name: [facility] - [place], [place] - [address] [phone] [facility] [phone]      Pharmacy representative phone number: [phone]    What medication are you calling in regards to: THERA M PLUS TABLET     What question does the pharmacy have: VERIFY QUANTITY     Who is the provider that prescribed the medication: TROY CUNNINGHAM     Additional notes:  PLEASE CALL

## 2023-11-08 NOTE — TELEPHONE ENCOUNTER
I am okay with her taking any multivitamin that contains iron.  She can get this over-the-counter or if she does have 1 covered by insurance plan I be happy to send this to [facility].

## 2023-12-12 ENCOUNTER — TRANSCRIBE ORDERS (OUTPATIENT)
Dept: ADMINISTRATIVE | Facility: HOSPITAL | Age: 78
End: 2023-12-12
Payer: MEDICARE

## 2023-12-12 DIAGNOSIS — Z12.31 VISIT FOR SCREENING MAMMOGRAM: Primary | ICD-10-CM

## 2024-02-01 ENCOUNTER — OFFICE VISIT (OUTPATIENT)
Dept: FAMILY MEDICINE CLINIC | Facility: CLINIC | Age: 79
End: 2024-02-01
Payer: MEDICARE

## 2024-02-01 VITALS
BODY MASS INDEX: 26.01 KG/M2 | HEART RATE: 62 BPM | HEIGHT: 69 IN | WEIGHT: 175.6 LBS | OXYGEN SATURATION: 100 % | DIASTOLIC BLOOD PRESSURE: 74 MMHG | SYSTOLIC BLOOD PRESSURE: 122 MMHG

## 2024-02-01 DIAGNOSIS — E78.5 DYSLIPIDEMIA (HIGH LDL; LOW HDL): ICD-10-CM

## 2024-02-01 DIAGNOSIS — K21.9 GASTROESOPHAGEAL REFLUX DISEASE WITHOUT ESOPHAGITIS: ICD-10-CM

## 2024-02-01 DIAGNOSIS — M25.572 ACUTE LEFT ANKLE PAIN: Primary | ICD-10-CM

## 2024-02-01 RX ORDER — VALACYCLOVIR HYDROCHLORIDE 500 MG/1
500 TABLET, FILM COATED ORAL DAILY
COMMUNITY
Start: 2023-12-23

## 2024-02-01 NOTE — ASSESSMENT & PLAN NOTE
Post fall.  Recommend rest, ice, elevation, and Tylenol or Motrin as needed for pain.  No point tenderness on physical exam, will defer imaging at this time listed for symptoms persist or worsen.

## 2024-02-01 NOTE — PROGRESS NOTES
Office Note     Name: Denise Briggs    : 1945     MRN: [id number]     Chief Complaint  Fall (Pt fell on her way here and states hands and legs are hurting and he left ankle)    Subjective     History of Present Illness:  Denise Briggs is a 78 y.o. female who presents today for status post fall.    Patient states that she was getting into her vehicle to come to her appointment today when her foot caught the lip on the curb and she did fall.  She reports that she fell onto her knees and hands and has some minor abrasions.  She is having some pain in her left ankle that is only present with inversion of the ankle.  She states she has no issue with walking or her gait and she has no point tenderness in her ankle she denies any bruising.    Patient does have an extensive medical history.  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  She is currently on a regimen of immunoglobulin therapy azithromycin, and having success with this.  She also has chronic bronchiectasis and is prescribed Singulair and omeprazole.  Patient also has a history of myasthenia gravis and is currently controlled on pyridostigmine  which is prescribed by her gastroenterologist.  Patient also has a diagnosis of orthostatic hypotension.      Patient has a history of hyperlipidemia and is currently well controlled on Lipitor.  She reports she did have a lipid panel completed in the past year.      She has a history of chronic back pain and undergone extensive work-up.  She does have recent MRI imaging of her lumbar spine.  She was following with a PM&R physician and underwent a localized steroid injection which did exacerbate her symptoms.  Patient is currently taking diclofenac daily and Tylenol as needed for her back pain.  Patient reports she is not interested in narcotic medications for back pain.     Patient does report a personal history of major depressive disorder.  She was previously  on a variety of medications, most recently Wellbutrin.  She reports that she did not tolerate these medications.  She does have a history of seeing psychiatrist and undergoing talk therapy.  Patient reports she did not see much personal benefit from this.  She is having increased stressors related to the health of her  at this time.  She plans to discuss with his neurologist resources that may help due to his impaired memory.     Patient reports she is up-to-date on her vaccinations and did receive these at [facility].  She does have a previous history of endometriosis status post hysterectomy.  She also has a history of rectal prolapse.  Due to thinning of her pelvic floor she is recommended to be on estrogen therapy chronically, which is prescribed by gynecology.  she is up-to-date on her cancer screenings.      Review of Systems:   Review of Systems   Musculoskeletal:  Positive for arthralgias.   All other systems reviewed and are negative.      Past Medical History:   Past Medical History:   Diagnosis Date    Arthritis     Bronchiectasis     Constipation     CVID (common variable immunodeficiency)     has infusions monthly and follows up with Dr. Paul    Elevated cholesterol     GERD (gastroesophageal reflux disease)     MRSA infection     ~ 2016 after laminectomy.  Debreded incision and tx with oral abx, followed by infection disease at that time.      Pneumonia     x 20 occurences per pt report, last issue around 2010    Pre-syncope 08/24/2021    Last Assessment & Plan: Formatting of this note might be different from the original. - Evaluated in ED in August and told she had orthostatic hypotension at that time, etiology unknown - She believes pre-syncope could be related to medications, recently stopped taking Wellbutrin - The only medication on her list that is potentially contributing is oxybutynin, discussed this with her but she elect    Rectal prolapse        Past Surgical History:   Past  Surgical History:   Procedure Laterality Date    ABDOMINAL SURGERY      r/t endometriosis    APPENDECTOMY      BRONCHOSCOPY      x2    COLON RESECTION Left 1/20/2020    Procedure: EXTEND LEFT HEMICOLECTOMY WITH RECTOPEXY;  Surgeon: Luke Vail MD;  Location: [facility];  Service: General    COLONOSCOPY      HYSTERECTOMY      LUMBAR LAMINECTOMY      LUMBAR WOUND DEBRIDEMENT      do to MRSA after laminectomy ~ 2016    OOPHORECTOMY      RECTAL PROLAPSE REPAIR      REDUCTION MAMMAPLASTY Bilateral 1975       Family History:   Family History   Problem Relation Age of Onset    Breast cancer Sister 50    Ovarian cancer Neg Hx        Social History:   Social History     Socioeconomic History    Marital status:    Tobacco Use    Smoking status: Never    Smokeless tobacco: Never   Substance and Sexual Activity    Alcohol use: Yes     Comment: rare    Drug use: Never    Sexual activity: Defer       Immunizations:   Immunization History   Administered Date(s) Administered    31-influenza Vac Quardvalent Preservativ 10/10/2018    ABRYSVO (RSV, 60+ or pregnant women 32-36 wks) 10/05/2023    COVID-19 (MODERNA) 1st,2nd,3rd Dose Monovalent 01/28/2021, 03/04/2021, 08/15/2021    COVID-19 (MODERNA) BIVALENT 12+YRS 09/13/2022    COVID-19 (MODERNA) Monovalent Original Booster 03/03/2022    COVID-19 F23 (MODERNA) 12YRS+ (SPIKEVAX) 10/05/2023    Fluad Quad 65+ 10/09/2020, 10/19/2023    Fluzone (or Fluarix & Flulaval for VFC) >6mos 10/10/2018, 10/10/2018    Fluzone High Dose =>65 Years (Vaxcare ONLY) 11/01/2019    Fluzone High-Dose 65+yrs 11/01/2019, 09/16/2021, 09/23/2022    Gamma Globulin 12/28/2016, 01/27/2017, 02/25/2017, 04/17/2017, 11/27/2018    INFLUENZA A MONOVALENT (H5N1), ADJUVANTED-2013 10/10/2018    Influenza Seasonal Injectable 10/10/2018    Influenza, Unspecified 10/10/2018, 10/10/2018, 10/09/2020    Pneumococcal Conjugate 13-Valent (PCV13) 02/06/2015    Pneumococcal Polysaccharide (PPSV23) 08/24/2021    Shingrix  09/23/2022, 12/01/2022    Tdap 10/01/2023        Medications:     Current Outpatient Medications:     atorvastatin (LIPITOR) 40 MG tablet, Take 1 tablet by mouth Every Night., Disp: 90 tablet, Rfl: 3    azelastine (ASTELIN) 0.1 % nasal spray, 1 spray into the nostril(s) as directed by provider 2 (Two) Times a Day. Use in each nostril as directed, Disp: 3 each, Rfl: 3    azithromycin (ZITHROMAX) 250 MG tablet, Take 1 tablet by mouth See Admin Instructions. Take 1 tablet by mouth on Monday, Wednesday, and Friday, Disp: , Rfl:     cholecalciferol (VITAMIN D3) 25 MCG (1000 UT) tablet, Take 1 tablet by mouth Daily., Disp: 90 tablet, Rfl: 3    diclofenac (VOLTAREN) 75 MG EC tablet, Take 1 tablet by mouth 2 (Two) Times a Day., Disp: 180 tablet, Rfl: 3    Diclofenac Sodium (VOLTAREN) 1 % gel gel, 4 g., Disp: , Rfl:     estradiol (MINIVELLE, VIVELLE-DOT) 0.075 MG/24HR patch, Place 1 patch on the skin as directed by provider 2 (Two) Times a Week. Change on Tuesday and Friday, Disp: , Rfl:     Immune Globulin, Human, 0.5 GM/10ML solution, Infuse 60 mg into a venous catheter., Disp: , Rfl:     montelukast (SINGULAIR) 10 MG tablet, Take 1 tablet by mouth Every Night., Disp: 90 tablet, Rfl: 3    multivitamin with minerals (multivitamin) tablet tablet, Take 1 tablet by mouth Daily., Disp: 90 each, Rfl: 3    omeprazole (priLOSEC) 40 MG capsule, Take 1 capsule by mouth Daily., Disp: 90 capsule, Rfl: 3    valACYclovir (VALTREX) 500 MG tablet, Take 1 tablet by mouth Daily., Disp: , Rfl:     Allergies:   Allergies   Allergen Reactions    Mushroom Nausea And Vomiting    Shiitake Mushroom Rash and Other (See Comments)    Codeine Nausea Only     vomiting    Meperidine Rash, Other (See Comments) and Unknown (See Comments)     convulsions      Other reaction(s): Unknown   convulsions    Other reaction(s): Unknown   convulsions    Penicillins Rash    Pseudoephedrine Unknown (See Comments)     Other reaction(s): Unknown       Objective  "    Vital Signs  /74   Pulse 62   Ht 175.3 cm (69.02\")   Wt 79.7 kg (175 lb 9.6 oz)   SpO2 100%   BMI 25.92 kg/m²   Estimated body mass index is 25.92 kg/m² as calculated from the following:    Height as of this encounter: 175.3 cm (69.02\").    Weight as of this encounter: 79.7 kg (175 lb 9.6 oz).         Physical Exam  Vitals and nursing note reviewed.   Constitutional:       Appearance: Normal appearance. She is normal weight.   HENT:      Head: Normocephalic and atraumatic.      Nose: Nose normal.      Mouth/Throat:      Mouth: Mucous membranes are moist.   Eyes:      Extraocular Movements: Extraocular movements intact.      Pupils: Pupils are equal, round, and reactive to light.   Cardiovascular:      Rate and Rhythm: Normal rate and regular rhythm.   Pulmonary:      Effort: Pulmonary effort is normal.      Breath sounds: Normal breath sounds.   Abdominal:      General: Abdomen is flat.   Musculoskeletal:         General: Normal range of motion.      Cervical back: Normal range of motion.      Right ankle: Normal.      Left ankle: Normal.      Comments: Mild pain with inversion of left ankle   Skin:     General: Skin is warm.   Neurological:      General: No focal deficit present.      Mental Status: She is alert and oriented to person, place, and time.   Psychiatric:         Mood and Affect: Mood normal.         Behavior: Behavior normal.         Thought Content: Thought content normal.         Judgment: Judgment normal.          Procedures     Results:  No results found for this or any previous visit (from the past 24 hour(s)).     Assessment and Plan     Assessment/Plan:  Diagnoses and all orders for this visit:    1. Acute left ankle pain (Primary)  Assessment & Plan:  Post fall.  Recommend rest, ice, elevation, and Tylenol or Motrin as needed for pain.  No point tenderness on physical exam, will defer imaging at this time listed for symptoms persist or worsen.      2. Gastroesophageal reflux " disease without esophagitis  Overview:  Last Assessment & Plan: Formatting of this note might be different from the original. Symptoms well controlled on omeprazole, continue Formatting of this note might be different from the original. Last Assessment & Plan: Formatting of this note might be different from the original. Symptoms well controlled on omeprazole, continue    Assessment & Plan:  Continue PPI, will check vitamin levels at next encounter.      3. Dyslipidemia (high LDL; low HDL)  Assessment & Plan:  Continue atorvastatin, will check fasting lab work at next visit          Follow Up  Return in about 3 months (around 5/1/2024) for Annual.    Carol Barbosa DO   [facility]

## 2024-02-14 ENCOUNTER — TELEPHONE (OUTPATIENT)
Dept: FAMILY MEDICINE CLINIC | Facility: CLINIC | Age: 79
End: 2024-02-14
Payer: MEDICARE

## 2024-04-03 ENCOUNTER — HOSPITAL ENCOUNTER (OUTPATIENT)
Dept: MAMMOGRAPHY | Facility: HOSPITAL | Age: 79
Discharge: HOME | End: 2024-04-03
Admitting: OBSTETRICS & GYNECOLOGY
Payer: MEDICARE

## 2024-04-03 DIAGNOSIS — Z12.31 VISIT FOR SCREENING MAMMOGRAM: ICD-10-CM

## 2024-04-03 PROCEDURE — 77067 SCR MAMMO BI INCL CAD: CPT

## 2024-04-03 PROCEDURE — 77063 BREAST TOMOSYNTHESIS BI: CPT

## 2024-05-06 ENCOUNTER — OFFICE VISIT (OUTPATIENT)
Dept: FAMILY MEDICINE CLINIC | Facility: CLINIC | Age: 79
End: 2024-05-06
Payer: MEDICARE

## 2024-05-06 VITALS
HEART RATE: 64 BPM | SYSTOLIC BLOOD PRESSURE: 120 MMHG | OXYGEN SATURATION: 100 % | HEIGHT: 69 IN | BODY MASS INDEX: 25.98 KG/M2 | DIASTOLIC BLOOD PRESSURE: 70 MMHG | WEIGHT: 175.4 LBS

## 2024-05-06 DIAGNOSIS — E55.9 VITAMIN D DEFICIENCY: ICD-10-CM

## 2024-05-06 DIAGNOSIS — K21.9 GASTROESOPHAGEAL REFLUX DISEASE WITHOUT ESOPHAGITIS: ICD-10-CM

## 2024-05-06 DIAGNOSIS — E78.5 DYSLIPIDEMIA (HIGH LDL; LOW HDL): ICD-10-CM

## 2024-05-06 DIAGNOSIS — Z00.00 MEDICARE ANNUAL WELLNESS VISIT, SUBSEQUENT: Primary | ICD-10-CM

## 2024-05-06 DIAGNOSIS — M79.18 CHRONIC MUSCULOSKELETAL PAIN: ICD-10-CM

## 2024-05-06 DIAGNOSIS — I95.0 IDIOPATHIC HYPOTENSION: ICD-10-CM

## 2024-05-06 DIAGNOSIS — G89.29 CHRONIC MUSCULOSKELETAL PAIN: ICD-10-CM

## 2024-05-06 PROBLEM — M25.572 ACUTE LEFT ANKLE PAIN: Status: RESOLVED | Noted: 2024-02-01 | Resolved: 2024-05-06

## 2024-05-06 PROCEDURE — G0439 PPPS, SUBSEQ VISIT: HCPCS | Performed by: FAMILY MEDICINE

## 2024-05-06 PROCEDURE — 1160F RVW MEDS BY RX/DR IN RCRD: CPT | Performed by: FAMILY MEDICINE

## 2024-05-06 PROCEDURE — 1170F FXNL STATUS ASSESSED: CPT | Performed by: FAMILY MEDICINE

## 2024-05-06 PROCEDURE — 1159F MED LIST DOCD IN RCRD: CPT | Performed by: FAMILY MEDICINE

## 2024-05-06 RX ORDER — DULOXETIN HYDROCHLORIDE 20 MG/1
20 CAPSULE, DELAYED RELEASE ORAL DAILY
Qty: 90 CAPSULE | Refills: 3 | Status: SHIPPED | OUTPATIENT
Start: 2024-05-06

## 2024-05-07 ENCOUNTER — REFERRAL TRIAGE (OUTPATIENT)
Dept: CASE MANAGEMENT | Facility: OTHER | Age: 79
End: 2024-05-07
Payer: MEDICARE

## 2024-05-07 ENCOUNTER — TELEPHONE (OUTPATIENT)
Dept: FAMILY MEDICINE CLINIC | Facility: CLINIC | Age: 79
End: 2024-05-07
Payer: MEDICARE

## 2024-05-07 ENCOUNTER — PATIENT OUTREACH (OUTPATIENT)
Dept: CASE MANAGEMENT | Facility: OTHER | Age: 79
End: 2024-05-07
Payer: MEDICARE

## 2024-05-17 ENCOUNTER — PATIENT OUTREACH (OUTPATIENT)
Dept: CASE MANAGEMENT | Facility: OTHER | Age: 79
End: 2024-05-17
Payer: MEDICARE

## 2024-05-17 NOTE — OUTREACH NOTE
AMBULATORY CASE MANAGEMENT NOTE    Names and Relationships of Patient/Support Persons: Caller: ChesterDenise; Relationship: Self  Caller: ChestreDenise; Relationship: Self -     Patient Outreach    RN-OSBALDO called patient in follow up to discussing any services needed to help with 's care.  Patient stated she has not heard from a Home Health agency yet.  She asked if they would help with 's cognitive decline as well.  Suggested she ask the Therapist about a Speech Therapy evaluation for cognitive decline.   Patient stated  has seen a Neurologist, with [facility], and has another appt for him in June scheduled. Confirmed patient drives  to appts. Patient stated  is able to walk without an assistive device.  Patient stated she walks with a Cane, or holding on to her 's arm, due to back pain.   Discussed Advanced Directives.  She said they have a Living Will.  Discussed patient's support system.  She said she has 2 daughters who live in [place].; the  has a son who lives in [place].  Besides waiting on Home Health for , no current needs or questions voiced at this time.    Joanne SONG  Ambulatory Case Management    5/17/2024, 17:30 EDT

## 2024-05-22 ENCOUNTER — PATIENT OUTREACH (OUTPATIENT)
Dept: CASE MANAGEMENT | Facility: OTHER | Age: 79
End: 2024-05-22
Payer: MEDICARE

## 2024-05-22 NOTE — OUTREACH NOTE
AMBULATORY CASE MANAGEMENT NOTE    Names and Relationships of Patient/Support Persons: Caller: Chester, Virginia Hyacinth; Relationship: Self -     Patient Outreach    RN-ACM called patient to follow up regarding HRCM needs, including Home Health services. Patient stated that she heard from Home Health earlier in the day, and a man is coming this afternoon to the home for the first visit, for patient's .  Encouraged her to ask about PT, OT, and ST regarding cognitive decline in her . She voiced understanding.  Patient talked of her pain she deals with everyday, in her back; that she tried taking the pain pills given her by doctor, but they make her nauseated.  Patient said she keeps going as long as she can, then she has to go to bed. She spoke of her decline in health and strength.  RN-ACM asked patient if she was interested in Home Health PT/OT working with her.  Patient declined Home Health for her, saying she has tried them in the past and does not want it again. Patient stated that her  does help her in the kitchen when preparing a meal. No other questions or concerns voiced at this time.    Joanne SONG  Ambulatory Case Management    5/22/2024, 16:12 EDT

## 2024-05-23 ENCOUNTER — PATIENT OUTREACH (OUTPATIENT)
Dept: CASE MANAGEMENT | Facility: OTHER | Age: 79
End: 2024-05-23
Payer: MEDICARE

## 2024-05-23 NOTE — OUTREACH NOTE
AMBULATORY CASE MANAGEMENT NOTE    Names and Relationships of Patient/Support Persons: Caller: Denise Briggs; Relationship: Self -     Patient Outreach    RN-ACM called patient to follow up regarding 's possible admission to Home Health. Patient stated that [facility] came yesterday afternoon with orders for PT and OT for ; upon evaluation of , no HH PT or OT was needed. Patient said the  agency said they would pursue orders from PCP for Speech Therapy for the cognitive decline, and someone would call them back next week to get that started. Patient said, if the Speech Therapy had to be outpatient, she would not be able to drive him there every week, due to her level of pain she deals with.  She said she does her best to get her  to his doctor appts when needed.  Encouraged patient that Home ST is what will be pursued for her . Explored options of help like neighbors or friends near by.  Patient said no.  She said her daughters in [place] can help sometime, but she cannot ask them to help every week.      Care Coordination    RN-ACM called [facility], regarding patient's 's HH referral.  They confirmed that Home Health PT & OT were not needed by Mr. Briggs, but they have asked for Home ST orders for cognitive issues.  They said they have not received orders from PCP for Home ST yet.       RN-TRIM called PCP, Dr. MANFRED Barbosa's office, spoke with Triny.  Explained need for PCP orders for Home ST to go to [facility], for Mr. Briggs.  Triny said she would fax it on over to [facility] now.      Joanne SONG  Ambulatory Case Management    5/23/2024, 14:23 EDT

## 2024-06-10 ENCOUNTER — PATIENT OUTREACH (OUTPATIENT)
Dept: CASE MANAGEMENT | Facility: OTHER | Age: 79
End: 2024-06-10
Payer: MEDICARE

## 2024-06-10 NOTE — OUTREACH NOTE
AMBULATORY CASE MANAGEMENT NOTE    Names and Relationships of Patient/Support Persons: Caller: Denise Briggs; Relationship: Self  Caller: Denise Briggs; Relationship: Self  Caller: Denise Briggs; Relationship: Self -     Patient Outreach    Patient said that the Home Health Speech Therapist has been coming to see her  and is very helpful; the  is trying hard.  Patient said her daughter from [place] is coming today to take them on several errands.  Patient said she is not driving as much as she used to.  No further needs voiced for RN-ACM to address.     Joanne SONG  Ambulatory Case Management    6/10/2024, 10:45 EDT

## 2024-10-17 ENCOUNTER — TELEPHONE (OUTPATIENT)
Dept: FAMILY MEDICINE CLINIC | Facility: CLINIC | Age: 79
End: 2024-10-17
Payer: MEDICARE

## 2024-10-17 DIAGNOSIS — E78.2 MIXED HYPERLIPIDEMIA: ICD-10-CM

## 2024-10-17 DIAGNOSIS — G89.29 CHRONIC MUSCULOSKELETAL PAIN: ICD-10-CM

## 2024-10-17 DIAGNOSIS — K21.9 GASTROESOPHAGEAL REFLUX DISEASE WITHOUT ESOPHAGITIS: ICD-10-CM

## 2024-10-17 DIAGNOSIS — M79.18 CHRONIC MUSCULOSKELETAL PAIN: ICD-10-CM

## 2024-10-17 RX ORDER — DICLOFENAC SODIUM 75 MG/1
75 TABLET, DELAYED RELEASE ORAL 2 TIMES DAILY
Qty: 180 TABLET | Refills: 0 | Status: SHIPPED | OUTPATIENT
Start: 2024-10-17

## 2024-10-17 RX ORDER — ATORVASTATIN CALCIUM 40 MG/1
40 TABLET, FILM COATED ORAL NIGHTLY
Qty: 90 TABLET | Refills: 0 | Status: SHIPPED | OUTPATIENT
Start: 2024-10-17

## 2024-10-17 RX ORDER — OMEPRAZOLE 40 MG/1
40 CAPSULE, DELAYED RELEASE ORAL DAILY
Qty: 90 CAPSULE | Refills: 0 | Status: SHIPPED | OUTPATIENT
Start: 2024-10-17

## 2024-10-17 RX ORDER — ESTRADIOL 0.07 MG/D
1 FILM, EXTENDED RELEASE TRANSDERMAL 2 TIMES WEEKLY
OUTPATIENT
Start: 2024-10-17

## 2024-10-17 NOTE — TELEPHONE ENCOUNTER
Caller: Denise Briggs    Relationship: Self    Best call back number: [phone]    What medication are you requesting: PYRIDOSTIGMINE 60MG AND   FLUTICASONE 50MCG    If a prescription is needed, what is your preferred pharmacy and phone number: [facility] - [address] [phone] [facility] [phone]      Additional notes: PATIENT STATES SHE FORGOT TO GIVE THESE MEDICATIONS TO DR CUNNINGHAM AT HER LAST APPOINTMENT BUT SHE HAS BEEN ON THESE FOR YEARS

## 2024-10-17 NOTE — TELEPHONE ENCOUNTER
Caller: Denise Briggs    Relationship: Self    Best call back number: [phone]    Requested Prescriptions:   Requested Prescriptions     Pending Prescriptions Disp Refills    estradiol (MINIVELLE, VIVELLE-DOT) 0.075 MG/24HR patch       Sig: Place 1 patch on the skin as directed by provider 2 (Two) Times a Week. Change on Tuesday and Friday    diclofenac (VOLTAREN) 75 MG EC tablet 180 tablet 3     Sig: Take 1 tablet by mouth 2 (Two) Times a Day.    atorvastatin (LIPITOR) 40 MG tablet 90 tablet 3     Sig: Take 1 tablet by mouth Every Night.    omeprazole (priLOSEC) 40 MG capsule 90 capsule 3     Sig: Take 1 capsule by mouth Daily.        Pharmacy where request should be sent: [facility] [address] [phone] [facility] [phone]      Last office visit with prescribing clinician: 5/6/2024   Last telemedicine visit with prescribing clinician: Visit date not found   Next office visit with prescribing clinician: Visit date not found     Additional details provided by patient:       Arvind Dixon Rep   10/17/24 10:40 EDT

## 2024-10-21 NOTE — TELEPHONE ENCOUNTER
Carol Barbosa, DO  You14 minutes ago (12:39 PM)       I would be happy to refill these, can you please confirm the pyridostigmine frequency?   Unable to reach patient.    Please relay.

## 2024-11-07 ENCOUNTER — OFFICE VISIT (OUTPATIENT)
Dept: FAMILY MEDICINE CLINIC | Facility: CLINIC | Age: 79
End: 2024-11-07
Payer: MEDICARE

## 2024-11-07 DIAGNOSIS — M48.07 LUMBOSACRAL STENOSIS WITH NEUROGENIC CLAUDICATION: Primary | Chronic | ICD-10-CM

## 2024-11-07 DIAGNOSIS — R35.0 URINARY FREQUENCY: ICD-10-CM

## 2024-11-07 LAB
BILIRUB BLD-MCNC: NEGATIVE MG/DL
CLARITY, POC: ABNORMAL
COLOR UR: YELLOW
EXPIRATION DATE: ABNORMAL
GLUCOSE UR STRIP-MCNC: NEGATIVE MG/DL
KETONES UR QL: NEGATIVE
LEUKOCYTE EST, POC: NEGATIVE
Lab: ABNORMAL
NITRITE UR-MCNC: NEGATIVE MG/ML
PH UR: 6 [PH] (ref 5–8)
PROT UR STRIP-MCNC: NEGATIVE MG/DL
RBC # UR STRIP: ABNORMAL /UL
SP GR UR: 1 (ref 1–1.03)
UROBILINOGEN UR QL: NORMAL

## 2024-11-07 PROCEDURE — 81001 URINALYSIS AUTO W/SCOPE: CPT

## 2024-11-07 PROCEDURE — 87186 SC STD MICRODIL/AGAR DIL: CPT

## 2024-11-07 PROCEDURE — 87086 URINE CULTURE/COLONY COUNT: CPT

## 2024-11-07 PROCEDURE — 87077 CULTURE AEROBIC IDENTIFY: CPT

## 2024-11-07 PROCEDURE — 81003 URINALYSIS AUTO W/O SCOPE: CPT

## 2024-11-07 PROCEDURE — 99214 OFFICE O/P EST MOD 30 MIN: CPT

## 2024-11-07 PROCEDURE — 1125F AMNT PAIN NOTED PAIN PRSNT: CPT

## 2024-11-07 NOTE — PROGRESS NOTES
Office Note     Name: Denise Briggs    : 1945     MRN: [id number]     Chief Complaint  Medication Problem, Back Pain, and Urinary Frequency    Subjective     History of Present Illness:  Denise Briggs is a 78 y.o. female who presents today for chronic but worsening low back pain and new urinary frequency.  Past medical history includes degenerative disc disease of lumbar spine, immunodeficiencies, GERD, IBS, lumbosacral stenosis with intermittent neurogenic claudication, hyperlipidemia. PCP is Dr. Barbosa.     Presents today for chronic low back pain she feels is worsening. Patient is tearful as she feels frustrated with her physical situation.   Has chronic low back pain and extensive surgical history on her back . Feels her pain has worsened over the past 2 months. Wants to make sure it is not a kidney infection or UTI as she has had increased frequency in the last 24 hours. Denies dysuria, blood in her urine or abdominal pain. Denies loss of bowel or bladder control or saddle anesthesia. Denies fevers, but had chills possibly. No hx of kidney stones. States it is difficult for her to get comfortable at night. She states it could just be her back pain worsening. States she is emotionally and physically exhausted as she is caring for her  with dementia, too. Saw pain management at [facility] last year and had injections done, feels her pain has worsened since then. Her PCP trialed her on Cymbalta for pain, but states she had GI side effects and discontinued it. Takes Diclofenac which does help.   She denies chest pain, shortness of breath, abdominal pain, visual changes, dizziness or fevers. She states she feels her pain is worsening in her back and that there is nothing anyone can do for her. She has had bad expierences with neurosurgery and with different pain management groups. Most of her pain stems from her right low back and down her leg. She uses a cane to walk which  is chronic for her. No recent injuries or falls.         Review of Systems:   Review of Systems    Past Medical History:   Past Medical History:   Diagnosis Date    Arthritis     Bronchiectasis     Constipation     CVID (common variable immunodeficiency)     has infusions monthly and follows up with Dr. Paul    Elevated cholesterol     GERD (gastroesophageal reflux disease)     MRSA infection     ~ 2016 after laminectomy.  Debreded incision and tx with oral abx, followed by infection disease at that time.      Pneumonia     x 20 occurences per pt report, last issue around 2010    Pre-syncope 08/24/2021    Last Assessment & Plan: Formatting of this note might be different from the original. - Evaluated in ED in August and told she had orthostatic hypotension at that time, etiology unknown - She believes pre-syncope could be related to medications, recently stopped taking Wellbutrin - The only medication on her list that is potentially contributing is oxybutynin, discussed this with her but she elect    Rectal prolapse        Past Surgical History:   Past Surgical History:   Procedure Laterality Date    ABDOMINAL SURGERY      r/t endometriosis    APPENDECTOMY      BRONCHOSCOPY      x2    COLON RESECTION Left 1/20/2020    Procedure: EXTEND LEFT HEMICOLECTOMY WITH RECTOPEXY;  Surgeon: Luke Vail MD;  Location: [facility];  Service: General    COLONOSCOPY      HYSTERECTOMY      LUMBAR LAMINECTOMY      LUMBAR WOUND DEBRIDEMENT      do to MRSA after laminectomy ~ 2016    OOPHORECTOMY      RECTAL PROLAPSE REPAIR      REDUCTION MAMMAPLASTY Bilateral 1975       Family History:   Family History   Problem Relation Age of Onset    Breast cancer Sister 50    Ovarian cancer Neg Hx        Social History:   Social History     Socioeconomic History    Marital status:    Tobacco Use    Smoking status: Never    Smokeless tobacco: Never   Vaping Use    Vaping status: Never Used   Substance and Sexual Activity    Alcohol  use: Yes     Comment: rare    Drug use: Never    Sexual activity: Defer       Immunizations:   Immunization History   Administered Date(s) Administered    31-influenza Vac Quardvalent Preservativ 10/10/2018    ABRYSVO (RSV, 60+ or pregnant women 32-36 wks) 10/05/2023    COVID-19 (MODERNA) 12YRS+ (SPIKEVAX) 10/05/2023, 10/08/2024    COVID-19 (MODERNA) 1st,2nd,3rd Dose Monovalent 01/28/2021, 03/04/2021, 08/15/2021    COVID-19 (MODERNA) BIVALENT 12+YRS 09/13/2022    COVID-19 (MODERNA) Monovalent Original Booster 03/03/2022    Fluad Quad 65+ 10/09/2020, 10/19/2023    Fluzone (or Fluarix & Flulaval for VFC) >6mos 10/10/2018, 10/10/2018    Fluzone High-Dose 65+YRS 11/01/2019    Fluzone High-Dose 65+yrs 11/01/2019, 09/16/2021, 09/23/2022    Gamma Globulin 12/28/2016, 01/27/2017, 02/25/2017, 04/17/2017, 11/27/2018    INFLUENZA A MONOVALENT (H5N1), ADJUVANTED-2013 10/10/2018    Influenza Seasonal Injectable 10/10/2018    Influenza, Unspecified 10/10/2018, 10/10/2018, 10/09/2020    Pneumococcal Conjugate 13-Valent (PCV13) 02/06/2015    Pneumococcal Polysaccharide (PPSV23) 08/24/2021    Shingrix 09/23/2022, 12/01/2022    Tdap 10/01/2023        Medications:     Current Outpatient Medications:     atorvastatin (LIPITOR) 40 MG tablet, Take 1 tablet by mouth Every Night., Disp: 90 tablet, Rfl: 0    azelastine (ASTELIN) 0.1 % nasal spray, 1 spray into the nostril(s) as directed by provider 2 (Two) Times a Day. Use in each nostril as directed, Disp: 3 each, Rfl: 3    azithromycin (ZITHROMAX) 250 MG tablet, Take 1 tablet by mouth See Admin Instructions. Take 1 tablet by mouth on Monday, Wednesday, and Friday, Disp: , Rfl:     cholecalciferol (VITAMIN D3) 25 MCG (1000 UT) tablet, Take 1 tablet by mouth Daily., Disp: 90 tablet, Rfl: 3    diclofenac (VOLTAREN) 75 MG EC tablet, Take 1 tablet by mouth 2 (Two) Times a Day., Disp: 180 tablet, Rfl: 0    Diclofenac Sodium (VOLTAREN) 1 % gel gel, 4 g., Disp: , Rfl:     estradiol (MINIVELLE,  "VIVELLE-DOT) 0.075 MG/24HR patch, Place 1 patch on the skin as directed by provider 2 (Two) Times a Week. Change on Tuesday and Friday, Disp: , Rfl:     Immune Globulin, Human, 0.5 GM/10ML solution, Infuse 60 mg into a venous catheter., Disp: , Rfl:     montelukast (SINGULAIR) 10 MG tablet, Take 1 tablet by mouth Every Night., Disp: 90 tablet, Rfl: 3    multivitamin with minerals (multivitamin) tablet tablet, Take 1 tablet by mouth Daily., Disp: 90 each, Rfl: 3    omeprazole (priLOSEC) 40 MG capsule, Take 1 capsule by mouth Daily., Disp: 90 capsule, Rfl: 0    valACYclovir (VALTREX) 500 MG tablet, Take 1 tablet by mouth Daily., Disp: , Rfl:     Allergies:   Allergies   Allergen Reactions    Mushroom Nausea And Vomiting    Shiitake Mushroom Rash and Other (See Comments)    Codeine Nausea Only     vomiting    Meperidine Rash, Other (See Comments) and Unknown (See Comments)     convulsions      Other reaction(s): Unknown   convulsions    Other reaction(s): Unknown   convulsions    Penicillins Rash    Pseudoephedrine Unknown (See Comments)     Other reaction(s): Unknown       Objective     Vital Signs  Temp 98 °F (36.7 °C)   Ht 175.3 cm (69.02\")   BMI 25.89 kg/m²   Estimated body mass index is 25.89 kg/m² as calculated from the following:    Height as of this encounter: 175.3 cm (69.02\").    Weight as of 5/6/24: 79.6 kg (175 lb 6.4 oz).           Physical Exam  Vitals reviewed.   Constitutional:       General: She is not in acute distress.     Appearance: Normal appearance. She is not toxic-appearing.   HENT:      Head: Normocephalic and atraumatic.   Eyes:      Pupils: Pupils are equal, round, and reactive to light.   Cardiovascular:      Rate and Rhythm: Normal rate and regular rhythm.      Pulses: Normal pulses.      Heart sounds: Normal heart sounds.   Pulmonary:      Effort: Pulmonary effort is normal.      Breath sounds: Normal breath sounds.   Abdominal:      General: Abdomen is flat. Bowel sounds are normal.    "   Tenderness: There is no abdominal tenderness. There is no right CVA tenderness, left CVA tenderness or guarding.   Musculoskeletal:        Back:       Comments: Previous surgical scar to midline lumbar spine, well healed. No bruising, swelling or deformities present.   Tenderness to right buttocks region.    Skin:     General: Skin is warm.   Neurological:      General: No focal deficit present.      Mental Status: She is alert and oriented to person, place, and time.      Cranial Nerves: No cranial nerve deficit.      Gait: Gait abnormal (chronic, uses a cane.).   Psychiatric:         Mood and Affect: Affect is tearful.            Results:  No results found for this or any previous visit (from the past 24 hours).       Assessment and Plan     Assessment/Plan:  Diagnoses and all orders for this visit:    1. Lumbosacral stenosis with neurogenic claudication (Primary)  Overview:  Formatting of this note might be different from the original. Spinal stenosis, lumbosacral region Formatting of this note might be different from the original. Formatting of this note might be different from the original. Spinal stenosis, lumbosacral region    Orders:  -     Comprehensive Metabolic Panel; Future  -     CBC Auto Differential; Future    2. Urinary frequency  -     POCT urinalysis dipstick, automated  -     Urine Culture - Urine, Urine, Clean Catch; Future  -     Comprehensive Metabolic Panel; Future  -     CBC Auto Differential; Future  -     Urinalysis With Microscopic - Urine, Clean Catch; Future    I believe her pain is chronic, but worsening. Unlikely urinary related. Urine does not show signs of infection today.  1+ blood present.  Will check microscopic urinalysis and send for culture.  Other than increased frequency no other signs.  No history of kidney stones and his pain is localized to right buttocks and radiates down her right leg.  Did recommend CT abdomen and pelvis to rule out any kidney infection or kidney  "stones, but she declines.   I also discussed a new referral to neurosurgery or pain management and new CT or MRI of her lumbar spine with her changes. She declines both of these and states \"if it continues to worsen I will go to  because that is where I went before.\" Discussed ER precautions with her.     It is likely related to her chronic back pain and extensive surgical history.  With the worsening back pain I did recommend obtaining new stat CT of her lumbar spine.  She declined this discussed the risks of not obtaining imaging. Discussed other options including a steroid pack or Toradol, but patient states that \"nothing will help me I have tried everything.\"   Offered muscle relaxer which she states she has at home and she will try this when she gets home to see if this will help.    Attempted to call patient to tell her that her vital signs did not get recorded though they were obtained by the MA.  Attempted to call patient to ask her to check her blood pressure at home or come into clinic to let us check her vitals again, but have not been able to get a hold of her.      Follow Up  No follow-ups on file.    Kim Johnston PA-C   [facility]  "

## 2024-11-08 VITALS — TEMPERATURE: 98 F | BODY MASS INDEX: 25.89 KG/M2 | HEIGHT: 69 IN

## 2024-11-08 LAB
BACTERIA UR QL AUTO: ABNORMAL /HPF
BILIRUB UR QL STRIP: NEGATIVE
CLARITY UR: CLEAR
COLOR UR: YELLOW
GLUCOSE UR STRIP-MCNC: NEGATIVE MG/DL
HGB UR QL STRIP.AUTO: NEGATIVE
HYALINE CASTS UR QL AUTO: ABNORMAL /LPF
KETONES UR QL STRIP: NEGATIVE
LEUKOCYTE ESTERASE UR QL STRIP.AUTO: NEGATIVE
NITRITE UR QL STRIP: NEGATIVE
PH UR STRIP.AUTO: 7 [PH] (ref 5–8)
PROT UR QL STRIP: NEGATIVE
RBC # UR STRIP: ABNORMAL /HPF
REF LAB TEST METHOD: ABNORMAL
SP GR UR STRIP: <=1.005 (ref 1–1.03)
SQUAMOUS #/AREA URNS HPF: ABNORMAL /HPF
UROBILINOGEN UR QL STRIP: NORMAL
WBC # UR STRIP: ABNORMAL /HPF
YEAST URNS QL MICRO: PRESENT /HPF

## 2024-11-10 LAB — BACTERIA SPEC AEROBE CULT: ABNORMAL

## 2024-11-11 ENCOUNTER — TELEPHONE (OUTPATIENT)
Dept: FAMILY MEDICINE CLINIC | Facility: CLINIC | Age: 79
End: 2024-11-11
Payer: MEDICARE

## 2024-11-11 DIAGNOSIS — N30.00 ACUTE CYSTITIS WITHOUT HEMATURIA: Primary | ICD-10-CM

## 2024-11-11 RX ORDER — DOXYCYCLINE 100 MG/1
100 CAPSULE ORAL 2 TIMES DAILY
Qty: 14 CAPSULE | Refills: 0 | Status: SHIPPED | OUTPATIENT
Start: 2024-11-11 | End: 2024-11-18

## 2024-11-11 NOTE — TELEPHONE ENCOUNTER
Attempted to call the patient in regards to her urine sample.   Her urine does show infection.   The phone number listed does not have a voicemail and states the number isn't in service.     She needs antibiotics for her urinary infection.  Infection should respond to doxycycline twice daily for 7 days.  I will send this to the pharmacy and send a letter.

## 2024-11-19 ENCOUNTER — TELEPHONE (OUTPATIENT)
Dept: FAMILY MEDICINE CLINIC | Facility: CLINIC | Age: 79
End: 2024-11-19
Payer: MEDICARE

## 2024-11-19 NOTE — TELEPHONE ENCOUNTER
Patient stopped in office regarding her last visit for increased urinary frequency.     She states she has 2 days of medication left and is not getting better. She would like a return to let her know if this can be done or not or if she needs to come in and leave another sample.

## 2024-11-21 ENCOUNTER — TELEPHONE (OUTPATIENT)
Dept: URGENT CARE | Facility: CLINIC | Age: 79
End: 2024-11-21
Payer: MEDICARE

## 2024-11-21 NOTE — TELEPHONE ENCOUNTER
Patient called stating she is taking another antibiotic from her immunologist. Dr. Zhong prescribed her levofloxacin. Her immunologist put her on azithromycin 3x a week. Can she take both meds together. Please call her back at [phone]

## 2024-12-12 DIAGNOSIS — J47.9 BRONCHIECTASIS WITHOUT COMPLICATION: Chronic | ICD-10-CM

## 2024-12-13 RX ORDER — MONTELUKAST SODIUM 10 MG/1
10 TABLET ORAL NIGHTLY
Qty: 90 TABLET | Refills: 1 | Status: SHIPPED | OUTPATIENT
Start: 2024-12-13

## 2024-12-13 NOTE — TELEPHONE ENCOUNTER
Rx Refill Note  Requested Prescriptions     Pending Prescriptions Disp Refills    montelukast (SINGULAIR) 10 MG tablet [Pharmacy Med Name: MONTELUKAST SODIUM TABS 10MG] 90 tablet 3     Sig: TAKE 1 TABLET EVERY NIGHT      Last office visit with prescribing clinician: 5/6/2024   Last telemedicine visit with prescribing clinician: Visit date not found   Next office visit with prescribing clinician: 5/8/2025       Pili Webb MA  12/13/24, 08:02 EST

## 2025-01-24 ENCOUNTER — TELEPHONE (OUTPATIENT)
Dept: FAMILY MEDICINE CLINIC | Facility: CLINIC | Age: 80
End: 2025-01-24
Payer: MEDICARE

## 2025-01-24 DIAGNOSIS — E78.2 MIXED HYPERLIPIDEMIA: ICD-10-CM

## 2025-01-24 RX ORDER — ATORVASTATIN CALCIUM 40 MG/1
40 TABLET, FILM COATED ORAL NIGHTLY
Qty: 30 TABLET | Refills: 0 | Status: SHIPPED | OUTPATIENT
Start: 2025-01-24

## 2025-01-24 NOTE — TELEPHONE ENCOUNTER
Caller: Denise Briggs    Relationship: Self    Best call back number:  [phone] (Mobile)     Requested Prescriptions:   Requested Prescriptions      No prescriptions requested or ordered in this encounter    atorvastatin (LIPITOR) 40 MG tablet     Pharmacy where request should be sent: [facility] [address] [place] [address] [phone] [facility] [phone]      Last office visit with prescribing clinician: 5/6/2024   Last telemedicine visit with prescribing clinician: Visit date not found   Next office visit with prescribing clinician: 5/8/2025     Additional details provided by patient:  REQUESTING A 2 WEEK SUPPLY TO GO TO A LOCAL PHARMACY UNTIL SHE GETS HER MEDICATIONS IN THE MAIL   THE MAIL SAID THEY ARE TRACKING THIS FOR HER   THEY TOLD HER SHE NEEDS TO WRITE A LETTER TO HELP TRACE IT     Does the patient have less than a 3 day supply:  [x] Yes  [] No    Would you like a call back once the refill request has been completed: [] Yes [x] No    If the office needs to give you a call back, can they leave a voicemail: [] Yes [x] No

## 2025-02-05 DIAGNOSIS — K21.9 GASTROESOPHAGEAL REFLUX DISEASE WITHOUT ESOPHAGITIS: ICD-10-CM

## 2025-02-05 DIAGNOSIS — E78.2 MIXED HYPERLIPIDEMIA: ICD-10-CM

## 2025-02-05 RX ORDER — ATORVASTATIN CALCIUM 40 MG/1
40 TABLET, FILM COATED ORAL NIGHTLY
Qty: 30 TABLET | Refills: 0 | Status: SHIPPED | OUTPATIENT
Start: 2025-02-05

## 2025-02-05 RX ORDER — OMEPRAZOLE 40 MG/1
40 CAPSULE, DELAYED RELEASE ORAL DAILY
Qty: 90 CAPSULE | Refills: 0 | Status: SHIPPED | OUTPATIENT
Start: 2025-02-05

## 2025-02-05 NOTE — TELEPHONE ENCOUNTER
Caller: NAWAF MARCIANO WITH [facility]    Relationship: Other    Best call back number:[phone]     Requested Prescriptions:   Requested Prescriptions     Pending Prescriptions Disp Refills    atorvastatin (LIPITOR) 40 MG tablet 30 tablet 0     Sig: Take 1 tablet by mouth Every Night.    omeprazole (priLOSEC) 40 MG capsule 90 capsule 0     Sig: Take 1 capsule by mouth Daily.        Pharmacy where request should be sent: [facility] [address] [phone] [place] [phone]      Last office visit with prescribing clinician: 5/6/2024   Last telemedicine visit with prescribing clinician: Visit date not found   Next office visit with prescribing clinician: 5/8/2025     Additional details provided by patient: REQUESTING 90 DAY SUPPLY WITH 3 REFILLS EACH ON BOTH MEDICATIONS    Does the patient have less than a 3 day supply:  [] Yes  [x] No    Would you like a call back once the refill request has been completed: [] Yes [x] No    If the office needs to give you a call back, can they leave a voicemail: [] Yes [x] No    Arvind Lee Rep   02/05/25 10:07 EST

## 2025-02-26 DIAGNOSIS — G89.29 CHRONIC MUSCULOSKELETAL PAIN: ICD-10-CM

## 2025-02-26 DIAGNOSIS — M79.18 CHRONIC MUSCULOSKELETAL PAIN: ICD-10-CM

## 2025-02-26 RX ORDER — DICLOFENAC SODIUM 75 MG/1
75 TABLET, DELAYED RELEASE ORAL 2 TIMES DAILY
Qty: 180 TABLET | Refills: 0 | Status: SHIPPED | OUTPATIENT
Start: 2025-02-26

## 2025-04-04 DIAGNOSIS — E78.2 MIXED HYPERLIPIDEMIA: ICD-10-CM

## 2025-04-04 RX ORDER — ATORVASTATIN CALCIUM 40 MG/1
40 TABLET, FILM COATED ORAL NIGHTLY
Qty: 90 TABLET | Refills: 0 | Status: SHIPPED | OUTPATIENT
Start: 2025-04-04

## 2025-04-04 NOTE — TELEPHONE ENCOUNTER
Caller: Denise Briggs    Relationship: Self    Best call back number: [phone]    Requested Prescriptions:   Requested Prescriptions     Pending Prescriptions Disp Refills    atorvastatin (LIPITOR) 40 MG tablet 30 tablet 0     Sig: Take 1 tablet by mouth Every Night.      90 DAY SUPPLY  Pharmacy where request should be sent: [facility] [address] [phone] [facility] [phone]      Last office visit with prescribing clinician: 5/6/2024   Last telemedicine visit with prescribing clinician: Visit date not found   Next office visit with prescribing clinician: 5/8/2025     Additional details provided by patient: OUT OF MEDICATION     Does the patient have less than a 3 day supply:  [x] Yes  [] No    Would you like a call back once the refill request has been completed: [x] Yes [] No    If the office needs to give you a call back, can they leave a voicemail: [x] Yes [] No    Arvind Watt Rep   04/04/25 10:24 EDT

## 2025-05-08 ENCOUNTER — OFFICE VISIT (OUTPATIENT)
Dept: FAMILY MEDICINE CLINIC | Facility: CLINIC | Age: 80
End: 2025-05-08
Payer: MEDICARE

## 2025-05-08 VITALS
SYSTOLIC BLOOD PRESSURE: 138 MMHG | DIASTOLIC BLOOD PRESSURE: 81 MMHG | OXYGEN SATURATION: 100 % | WEIGHT: 175 LBS | HEART RATE: 67 BPM | HEIGHT: 69 IN | BODY MASS INDEX: 25.92 KG/M2

## 2025-05-08 DIAGNOSIS — J47.9 BRONCHIECTASIS WITHOUT COMPLICATION: Chronic | ICD-10-CM

## 2025-05-08 DIAGNOSIS — D84.9 IMMUNODEFICIENCY: ICD-10-CM

## 2025-05-08 DIAGNOSIS — Z00.00 MEDICARE ANNUAL WELLNESS VISIT, SUBSEQUENT: Primary | ICD-10-CM

## 2025-05-08 DIAGNOSIS — K21.9 GASTROESOPHAGEAL REFLUX DISEASE WITHOUT ESOPHAGITIS: ICD-10-CM

## 2025-05-08 DIAGNOSIS — J31.0 NON-ALLERGIC RHINITIS: ICD-10-CM

## 2025-05-08 DIAGNOSIS — E55.9 VITAMIN D DEFICIENCY: ICD-10-CM

## 2025-05-08 DIAGNOSIS — G89.29 CHRONIC MUSCULOSKELETAL PAIN: ICD-10-CM

## 2025-05-08 DIAGNOSIS — M79.18 CHRONIC MUSCULOSKELETAL PAIN: ICD-10-CM

## 2025-05-08 DIAGNOSIS — E78.2 MIXED HYPERLIPIDEMIA: ICD-10-CM

## 2025-05-08 LAB
BILIRUB BLD-MCNC: NEGATIVE MG/DL
CLARITY, POC: CLEAR
COLOR UR: YELLOW
EXPIRATION DATE: ABNORMAL
GLUCOSE UR STRIP-MCNC: NEGATIVE MG/DL
KETONES UR QL: NEGATIVE
LEUKOCYTE EST, POC: NEGATIVE
Lab: ABNORMAL
NITRITE UR-MCNC: NEGATIVE MG/ML
PH UR: 7 [PH] (ref 5–8)
PROT UR STRIP-MCNC: NEGATIVE MG/DL
RBC # UR STRIP: ABNORMAL /UL
SP GR UR: 1 (ref 1–1.03)
UROBILINOGEN UR QL: NORMAL

## 2025-05-08 RX ORDER — MONTELUKAST SODIUM 10 MG/1
10 TABLET ORAL NIGHTLY
Qty: 90 TABLET | Refills: 3 | Status: SHIPPED | OUTPATIENT
Start: 2025-05-08

## 2025-05-08 RX ORDER — CHOLECALCIFEROL (VITAMIN D3) 25 MCG
1000 TABLET ORAL DAILY
Qty: 90 TABLET | Refills: 3 | Status: SHIPPED | OUTPATIENT
Start: 2025-05-08

## 2025-05-08 RX ORDER — DICLOFENAC SODIUM 75 MG/1
75 TABLET, DELAYED RELEASE ORAL 2 TIMES DAILY
Qty: 180 TABLET | Refills: 3 | Status: SHIPPED | OUTPATIENT
Start: 2025-05-08

## 2025-05-08 RX ORDER — OMEPRAZOLE 40 MG/1
40 CAPSULE, DELAYED RELEASE ORAL DAILY
Qty: 90 CAPSULE | Refills: 3 | Status: SHIPPED | OUTPATIENT
Start: 2025-05-08

## 2025-05-08 RX ORDER — ATORVASTATIN CALCIUM 40 MG/1
40 TABLET, FILM COATED ORAL NIGHTLY
Qty: 90 TABLET | Refills: 3 | Status: SHIPPED | OUTPATIENT
Start: 2025-05-08

## 2025-05-08 RX ORDER — AZELASTINE 1 MG/ML
1 SPRAY, METERED NASAL 2 TIMES DAILY
Qty: 3 EACH | Refills: 3 | Status: SHIPPED | OUTPATIENT
Start: 2025-05-08

## 2025-05-08 NOTE — ASSESSMENT & PLAN NOTE
Continue azelastine  Orders:    azelastine (ASTELIN) 0.1 % nasal spray; Administer 1 spray into the nostril(s) as directed by provider 2 (Two) Times a Day. Use in each nostril as directed

## 2025-05-08 NOTE — ASSESSMENT & PLAN NOTE
Lipid abnormalities are stable    Plan:  Continue same medication/s without change.      Discussed medication dosage, use, side effects, and goals of treatment in detail.    Counseled patient on lifestyle modifications to help control hyperlipidemia.   Cholesterol lowering dietary information shared with patient.  Advised patient to exercise for 150 minutes weekly. (30 minute brisk walk, 5 days a week for example)    Patient Treatment Goals:   LDL goal is under 100    Followup at the next regular appointment.    Orders:    CBC (No Diff); Future    Lipid Panel; Future    Comprehensive Metabolic Panel; Future    atorvastatin (LIPITOR) 40 MG tablet; Take 1 tablet by mouth Every Night.

## 2025-05-08 NOTE — ASSESSMENT & PLAN NOTE
Continue current regimen  Orders:    montelukast (SINGULAIR) 10 MG tablet; Take 1 tablet by mouth Every Night.

## 2025-05-08 NOTE — ASSESSMENT & PLAN NOTE
Continue current regimen prescribed by immunology.  Continue azithromycin as directed.  Orders:    POCT urinalysis dipstick, automated

## 2025-05-08 NOTE — ASSESSMENT & PLAN NOTE
Continue PPI   Orders:    omeprazole (priLOSEC) 40 MG capsule; Take 1 capsule by mouth Daily.    Magnesium; Future    Vitamin B12; Future

## 2025-05-08 NOTE — PROGRESS NOTES
Subjective   The ABCs of the Annual Wellness Visit  Medicare Wellness Visit      Denise Briggs is a 79 y.o. patient who presents for a Medicare Wellness Visit.    The following portions of the patient's history were reviewed and   updated as appropriate: allergies, current medications, past family history, past medical history, past social history, past surgical history, and problem list.    Compared to one year ago, the patient's physical   health is worse.  Compared to one year ago, the patient's mental   health is worse.    Recent Hospitalizations:  She was not admitted to the hospital during the last year.     Current Medical Providers:  Patient Care Team:  Carol Barbosa DO as PCP - General (Family Medicine)  Fox Braswell MD as Consulting Physician (Internal Medicine)  Sky Paul MD as Consulting Physician (Allergy and Immunology)  GYN- Dr. Ca    Outpatient Medications Prior to Visit   Medication Sig Dispense Refill    azithromycin (ZITHROMAX) 250 MG tablet Take 1 tablet by mouth See Admin Instructions. Take 1 tablet by mouth on Monday, Wednesday, and Friday      Diclofenac Sodium (VOLTAREN) 1 % gel gel 4 g.      estradiol (MINIVELLE, VIVELLE-DOT) 0.075 MG/24HR patch Place 1 patch on the skin as directed by provider 2 (Two) Times a Week. Change on Tuesday and Friday      fluticasone (FLONASE) 50 MCG/ACT nasal spray ADMINISTER 1 SPRAY IN EACH NOSTRIL ONE TIME EACH DAY, SHAKE GENTLY, BEFORE FIRST USE, PRIME PUMP, AFTER USE, CLEAN TIP AND REPLACE CAP.      Immune Globulin, Human, 0.5 GM/10ML solution Infuse 60 mg into a venous catheter.      multivitamin with minerals (multivitamin) tablet tablet Take 1 tablet by mouth Daily. 90 each 3    pyridostigmine (MESTINON) 60 MG tablet       valACYclovir (VALTREX) 500 MG tablet Take 1 tablet by mouth Daily.      atorvastatin (LIPITOR) 40 MG tablet Take 1 tablet by mouth Every Night. 90 tablet 0    azelastine (ASTELIN) 0.1 %  nasal spray 1 spray into the nostril(s) as directed by provider 2 (Two) Times a Day. Use in each nostril as directed 3 each 3    cholecalciferol (VITAMIN D3) 25 MCG (1000 UT) tablet Take 1 tablet by mouth Daily. 90 tablet 3    diclofenac (VOLTAREN) 75 MG EC tablet TAKE 1 TABLET TWICE A  tablet 0    montelukast (SINGULAIR) 10 MG tablet TAKE 1 TABLET EVERY NIGHT 90 tablet 1    omeprazole (priLOSEC) 40 MG capsule Take 1 capsule by mouth Daily. 90 capsule 0     No facility-administered medications prior to visit.     No opioid medication identified on active medication list. I have reviewed chart for other potential  high risk medication/s and harmful drug interactions in the elderly.      Aspirin is not on active medication list.  Aspirin use is not indicated based on review of current medical condition/s. Risk of harm outweighs potential benefits.  .    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    History of MRSA infection    Irritable bowel syndrome (IBS)    Bronchiectasis without complication    GERD (gastroesophageal reflux disease)    Mixed hyperlipidemia    Non-allergic rhinitis    Chronic musculoskeletal pain    Immunodeficiency    DDD (degenerative disc disease), lumbar    Dyslipidemia (high LDL; low HDL)    Idiopathic hypotension    Lumbosacral stenosis with neurogenic claudication    Major depressive disorder, single episode, mild    Menopausal syndrome (hot flashes)    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  ACP discussion was held with the patient during this visit. Patient has an advance directive (not in EMR), copy requested.            Objective   Vitals:    05/08/25 1043   BP: 138/81   Pulse: 67  "  SpO2: 100%   Weight: 79.4 kg (175 lb)   Height: 175.3 cm (69.02\")   PainSc: 7        Estimated body mass index is 25.83 kg/m² as calculated from the following:    Height as of this encounter: 175.3 cm (69.02\").    Weight as of this encounter: 79.4 kg (175 lb).         Does the patient have evidence of cognitive impairment? No                                                                                                Health  Risk Assessment    Smoking Status:  Social History     Tobacco Use   Smoking Status Never   Smokeless Tobacco Never     Alcohol Consumption:  Social History     Substance and Sexual Activity   Alcohol Use Yes    Comment: rare       Fall Risk Screen  STEADI Fall Risk Assessment was completed, and patient is at MODERATE risk for falls. Assessment completed on:2025    Depression Screening   Little interest or pleasure in doing things? Not at all   Feeling down, depressed, or hopeless? Not at all   PHQ-2 Total Score 0      Health Habits and Functional and Cognitive Screenin/8/2025    10:48 AM   Functional & Cognitive Status   Do you have difficulty preparing food and eating? No   Do you have difficulty bathing yourself, getting dressed or grooming yourself? No   Do you have difficulty using the toilet? No   Do you have difficulty moving around from place to place? No   Do you have trouble with steps or getting out of a bed or a chair? No   Current Diet Well Balanced Diet   Dental Exam Up to date   Eye Exam Up to date   Exercise (times per week) 0 times per week   Current Exercises Include No Regular Exercise   Do you need help using the phone?  No   Are you deaf or do you have serious difficulty hearing?  No   Do you need help to go to places out of walking distance? Yes   Do you need help shopping? Yes   Do you need help preparing meals?  No   Do you need help with housework?  No   Do you need help with laundry? No   Do you need help taking your medications? No   Do you need help " managing money? No   Do you ever drive or ride in a car without wearing a seat belt? No   Have you felt unusual stress, anger or loneliness in the last month? No   Who do you live with? Spouse   If you need help, do you have trouble finding someone available to you? No   Have you been bothered in the last four weeks by sexual problems? No   Do you have difficulty concentrating, remembering or making decisions? No           Age-appropriate Screening Schedule:  Refer to the list below for future screening recommendations based on patient's age, sex and/or medical conditions. Orders for these recommended tests are listed in the plan section. The patient has been provided with a written plan.    Health Maintenance List  Health Maintenance   Topic Date Due    LIPID PANEL  07/26/2024    ANNUAL WELLNESS VISIT  05/06/2025    COVID-19 Vaccine (8 - 2024-25 season) 05/22/2025 (Originally 4/8/2025)    INFLUENZA VACCINE  07/01/2025    DXA SCAN  03/21/2027    TDAP/TD VACCINES (2 - Td or Tdap) 10/01/2033    RSV Vaccine - Adults  Completed    Pneumococcal Vaccine 50+  Completed    ZOSTER VACCINE  Completed    COLONOSCOPY  Discontinued                                                                                                                                                CMS Preventative Services Quick Reference  Risk Factors Identified During Encounter  None Identified    The above risks/problems have been discussed with the patient.  Pertinent information has been shared with the patient in the After Visit Summary.  An After Visit Summary and PPPS were made available to the patient.    Follow Up:   Next Medicare Wellness visit to be scheduled in 1 year.         Additional E&M Note during same encounter follows:  Patient has additional, significant, and separately identifiable condition(s)/problem(s) that require work above and beyond the Medicare Wellness Visit     Chief Complaint  Medicare Wellness-subsequent  "(annual)    Subjective   Patient presents today with her .  She notes that she is doing okay today.  She is following with a back specialist and has an appointment scheduled next month.  She notes that she had to undergo various testing including imaging a DEXA scan before her surgery would be discussed and she is hopeful that this will be discussed at the next visit.  She is requesting refills on her medications today.  She notes that she recently followed up with her gynecologist and was recommended to continue the estradiol.  She notes that her vaginal walls are doing well.  She also recently followed up with her immunologist at .      [place] is also being seen today for additional medical problem/s.    Review of Systems   Musculoskeletal:  Positive for back pain.   All other systems reviewed and are negative.             Objective   Vital Signs:  /81   Pulse 67   Ht 175.3 cm (69.02\")   Wt 79.4 kg (175 lb)   SpO2 100%   BMI 25.83 kg/m²   Physical Exam  Vitals and nursing note reviewed.   Constitutional:       Appearance: Normal appearance. She is normal weight.   HENT:      Head: Normocephalic and atraumatic.      Nose: Nose normal.      Mouth/Throat:      Mouth: Mucous membranes are moist.   Eyes:      Extraocular Movements: Extraocular movements intact.      Pupils: Pupils are equal, round, and reactive to light.   Cardiovascular:      Rate and Rhythm: Normal rate.   Pulmonary:      Effort: Pulmonary effort is normal.   Abdominal:      General: Abdomen is flat.   Musculoskeletal:         General: Normal range of motion.      Cervical back: Normal range of motion.   Skin:     General: Skin is warm.   Neurological:      General: No focal deficit present.      Mental Status: She is alert and oriented to person, place, and time.   Psychiatric:         Mood and Affect: Mood normal.         Behavior: Behavior normal.         Thought Content: Thought content normal.         Judgment: Judgment " normal.                Assessment and Plan      Medicare annual wellness visit, subsequent         Mixed hyperlipidemia   Lipid abnormalities are stable    Plan:  Continue same medication/s without change.      Discussed medication dosage, use, side effects, and goals of treatment in detail.    Counseled patient on lifestyle modifications to help control hyperlipidemia.   Cholesterol lowering dietary information shared with patient.  Advised patient to exercise for 150 minutes weekly. (30 minute brisk walk, 5 days a week for example)    Patient Treatment Goals:   LDL goal is under 100    Followup at the next regular appointment.    Orders:    CBC (No Diff); Future    Lipid Panel; Future    Comprehensive Metabolic Panel; Future    atorvastatin (LIPITOR) 40 MG tablet; Take 1 tablet by mouth Every Night.    BMI 25.0-25.9,adult         Immunodeficiency  Continue current regimen prescribed by immunology.  Continue azithromycin as directed.  Orders:    POCT urinalysis dipstick, automated     Non-allergic rhinitis  Continue azelastine  Orders:    azelastine (ASTELIN) 0.1 % nasal spray; Administer 1 spray into the nostril(s) as directed by provider 2 (Two) Times a Day. Use in each nostril as directed     Vitamin D deficiency  continue supplement  Orders:    cholecalciferol (VITAMIN D3) 25 MCG (1000 UT) tablet; Take 1 tablet by mouth Daily.     Chronic musculoskeletal pain  Extensive history of rehab, spinal surgeries, and injection therapies.  Continue diclofenac  Orders:    diclofenac (VOLTAREN) 75 MG EC tablet; Take 1 tablet by mouth 2 (Two) Times a Day.     Bronchiectasis without complication  Continue current regimen  Orders:    montelukast (SINGULAIR) 10 MG tablet; Take 1 tablet by mouth Every Night.     Gastroesophageal reflux disease without esophagitis  Continue PPI   Orders:    omeprazole (priLOSEC) 40 MG capsule; Take 1 capsule by mouth Daily.    Magnesium; Future    Vitamin B12; Future           I spent 30  minutes caring for [place] on this date of service. This time includes time spent by me in the following activities:preparing for the visit, reviewing tests, obtaining and/or reviewing a separately obtained history, performing a medically appropriate examination and/or evaluation , counseling and educating the patient/family/caregiver, ordering medications, tests, or procedures, documenting information in the medical record, independently interpreting results and communicating that information with the patient/family/caregiver, and care coordination  Follow Up   Return in about 6 months (around 11/8/2025) for Recheck.  Patient was given instructions and counseling regarding her condition or for health maintenance advice. Please see specific information pulled into the AVS if appropriate.

## 2025-05-08 NOTE — ASSESSMENT & PLAN NOTE
continue supplement  Orders:    cholecalciferol (VITAMIN D3) 25 MCG (1000 UT) tablet; Take 1 tablet by mouth Daily.

## 2025-05-08 NOTE — ASSESSMENT & PLAN NOTE
Extensive history of rehab, spinal surgeries, and injection therapies.  Continue diclofenac  Orders:    diclofenac (VOLTAREN) 75 MG EC tablet; Take 1 tablet by mouth 2 (Two) Times a Day.

## 2025-05-16 ENCOUNTER — TELEPHONE (OUTPATIENT)
Dept: FAMILY MEDICINE CLINIC | Facility: CLINIC | Age: 80
End: 2025-05-16
Payer: MEDICARE

## 2025-05-16 RX ORDER — TRETINOIN 1 MG/G
1 CREAM TOPICAL NIGHTLY
Qty: 45 G | Refills: 1 | Status: SHIPPED | OUTPATIENT
Start: 2025-05-16 | End: 2025-05-19 | Stop reason: SDUPTHER

## 2025-05-16 NOTE — TELEPHONE ENCOUNTER
Caller: Denise Briggs    Relationship: Self    Best call back number: [phone]     What medication are you requesting: RETNA A 1 % CREAM    What are your current symptoms: ACNE    How long have you been experiencing symptoms:     Have you had these symptoms before:    [] Yes  [] No    Have you been treated for these symptoms before:   [] Yes  [] No    If a prescription is needed, what is your preferred pharmacy and phone number:  [facility] IN [place] [phone]    Additional notes: PATIENT WANTS TO SEE IF PROVIDER CAN PRESCRIBE MEDICATION FOR HER ACNE.

## 2025-05-19 ENCOUNTER — TELEPHONE (OUTPATIENT)
Dept: FAMILY MEDICINE CLINIC | Facility: CLINIC | Age: 80
End: 2025-05-19
Payer: MEDICARE

## 2025-05-19 ENCOUNTER — PRIOR AUTHORIZATION (OUTPATIENT)
Dept: FAMILY MEDICINE CLINIC | Facility: CLINIC | Age: 80
End: 2025-05-19
Payer: MEDICARE

## 2025-05-19 RX ORDER — TRETINOIN 1 MG/G
CREAM TOPICAL
Qty: 45 G | Refills: 1 | Status: SHIPPED | OUTPATIENT
Start: 2025-05-19

## 2025-05-19 NOTE — TELEPHONE ENCOUNTER
(Key: [id number]) - [id number]  Tretinoin 0.1% cream  status: PA Response - ApprovedCreated: May 19th, 2025 [id number]: May 19th, 2025    Approved today by [employer] 2017  CaseId:[id number];Status:Approved;Review Type:Prior Auth;Coverage Start Date:04/19/2025;Coverage End Date:05/19/2026;  Effective Date: 4/19/2025  Authorization Expiration Date: 5/19/2026

## 2025-05-19 NOTE — TELEPHONE ENCOUNTER
Pharmacy Name: [facility] [address] [place] [address] [phone] [facility] [phone]      Pharmacy representative phone number: [phone]     What medication are you calling in regards to: TRETINOIN    What question does the pharmacy have: THE PHARMACY IS NEEDING TO KNOW THAT GRAM AMOUNT THAT THE PATIENT WILL BE APPLYING EVERY NIGHT FOR HER INSURANCE.     Who is the provider that prescribed the medication: DR CUNNINGHAM     Additional notes:

## (undated) DEVICE — LEX BASIC NO DRAPE: Brand: MEDLINE INDUSTRIES, INC.

## (undated) DEVICE — CLTH CLENS READYCLEANSE PERI CARE PK/5

## (undated) DEVICE — COVER,MAYO STAND,XL,STERILE: Brand: MEDLINE

## (undated) DEVICE — TUBING, SUCTION, 1/4" X 10', STRAIGHT: Brand: MEDLINE

## (undated) DEVICE — GOWN,NON-REINFORCED,SIRUS,SET IN SLV,XL: Brand: MEDLINE

## (undated) DEVICE — DRSNG WND BORDR/ADHS NONADHR/GZ LF 4X10IN STRL

## (undated) DEVICE — GLV SURG SENSICARE MICRO PF LF 8.5 STRL

## (undated) DEVICE — MEDI-VAC YANKAUER SUCTION HANDLE: Brand: CARDINAL HEALTH

## (undated) DEVICE — TRY SKINPREP DRYPREP

## (undated) DEVICE — TOWEL,OR,DSP,ST,BLUE,STD,4/PK,20PK/CS: Brand: MEDLINE

## (undated) DEVICE — SPNG LAP PREWSH SFTPK 18X18IN STRL PK/5

## (undated) DEVICE — DRAPE, LAVH, STERILE: Brand: MEDLINE

## (undated) DEVICE — SUT PDS 1 CTX 36IN VIO PDP371T

## (undated) DEVICE — COVER,LIGHT HANDLE,FLX,1/PK: Brand: MEDLINE INDUSTRIES, INC.

## (undated) DEVICE — INTENDED FOR TISSUE SEPARATION, AND OTHER PROCEDURES THAT REQUIRE A SHARP SURGICAL BLADE TO PUNCTURE OR CUT.: Brand: BARD-PARKER ® STAINLESS STEEL BLADES

## (undated) DEVICE — SUT VIC 12X27 D8116 BX/12

## (undated) DEVICE — SUT PROLN 2/0 PC3 8833H

## (undated) DEVICE — JELLY,LUBE,STERILE,FLIP TOP,TUBE,2-OZ: Brand: MEDLINE

## (undated) DEVICE — PENCL E/S HNDSWCH ROCKRBTN HOLSTR 10FT